# Patient Record
Sex: FEMALE | Race: ASIAN | Employment: PART TIME | URBAN - METROPOLITAN AREA
[De-identification: names, ages, dates, MRNs, and addresses within clinical notes are randomized per-mention and may not be internally consistent; named-entity substitution may affect disease eponyms.]

---

## 2017-02-13 ENCOUNTER — PATIENT MESSAGE (OUTPATIENT)
Dept: FAMILY MEDICINE CLINIC | Facility: CLINIC | Age: 42
End: 2017-02-13

## 2017-02-13 RX ORDER — INSULIN LISPRO 100 [IU]/ML
INJECTION, SUSPENSION SUBCUTANEOUS
Qty: 15 ML | Refills: 3 | Status: CANCELLED | OUTPATIENT
Start: 2017-02-13

## 2017-02-13 NOTE — TELEPHONE ENCOUNTER
From: Lashawn Solares  To: Debbie Guadarrama MD  Sent: 2/13/2017 10:32 AM CST  Subject: Medication Renewal Request    Original authorizing provider: MD Chery Jackson would like a refill of the following medications:  NOVOFINE 32G X 6 MM Does not

## 2017-02-14 NOTE — TELEPHONE ENCOUNTER
From: Narda Babinski  To: Leila Mayorga MD  Sent: 2/13/2017 10:19 AM CST  Subject: Other    Pls update my medical record. I had my Papsmear with Dr Patrick Doing on December 2016 and received my Influenza vaccine at work on October 2016.

## 2017-02-15 ENCOUNTER — PATIENT MESSAGE (OUTPATIENT)
Dept: FAMILY MEDICINE CLINIC | Facility: CLINIC | Age: 42
End: 2017-02-15

## 2017-02-15 RX ORDER — INSULIN LISPRO 100 [IU]/ML
INJECTION, SUSPENSION SUBCUTANEOUS
Qty: 18 PEN | Refills: 1 | Status: SHIPPED | OUTPATIENT
Start: 2017-02-15 | End: 2017-02-15

## 2017-02-15 RX ORDER — INSULIN LISPRO 100 [IU]/ML
INJECTION, SUSPENSION SUBCUTANEOUS
Qty: 18 PEN | Refills: 1 | Status: SHIPPED | OUTPATIENT
Start: 2017-02-15 | End: 2017-08-25

## 2017-02-15 NOTE — TELEPHONE ENCOUNTER
From: Caro Solares  To: Moose Rodgers MD  Sent: 2/15/2017 8:58 AM CST  Subject: Prescription Question    I sent a request to renew my prescriptions a few days ago but have not gotten them yet.  I still have refills but requested for 90 day supply prescript

## 2017-02-15 NOTE — TELEPHONE ENCOUNTER
Type ii or unspecified type diabetes mellitus without mention of complication, not stated as uncontrolled (hcc)  Doing well on Invokana, metformin and humalog  Will check labs  Follow-up in 1 year      Requesting Invokona, Metformin, Humalog, Novofin

## 2017-05-22 ENCOUNTER — TELEPHONE (OUTPATIENT)
Dept: FAMILY MEDICINE CLINIC | Facility: CLINIC | Age: 42
End: 2017-05-22

## 2017-05-22 DIAGNOSIS — E56.9 VITAMIN DEFICIENCY: ICD-10-CM

## 2017-05-22 DIAGNOSIS — Z00.00 ROUTINE GENERAL MEDICAL EXAMINATION AT A HEALTH CARE FACILITY: ICD-10-CM

## 2017-05-22 DIAGNOSIS — E11.9 TYPE 2 DIABETES MELLITUS WITHOUT COMPLICATION, UNSPECIFIED LONG TERM INSULIN USE STATUS: ICD-10-CM

## 2017-05-22 DIAGNOSIS — R80.9 MICROALBUMINURIA: ICD-10-CM

## 2017-05-22 DIAGNOSIS — D64.9 ANEMIA, UNSPECIFIED TYPE: Primary | ICD-10-CM

## 2017-05-22 NOTE — TELEPHONE ENCOUNTER
Lab orders pended, any other labs you would like for pt to have drawn?       Future Appointments  Date Time Provider Nazario Viki   6/2/2017 9:00 AM Alejandro Patel MD [facility] [address] [facility] [address]

## 2017-05-26 ENCOUNTER — LAB ENCOUNTER (OUTPATIENT)
Dept: LAB | Age: 42
End: 2017-05-26
Attending: INTERNAL MEDICINE
Payer: COMMERCIAL

## 2017-05-26 DIAGNOSIS — E11.9 TYPE 2 DIABETES MELLITUS WITHOUT COMPLICATION, UNSPECIFIED LONG TERM INSULIN USE STATUS: ICD-10-CM

## 2017-05-26 DIAGNOSIS — R80.9 MICROALBUMINURIA: ICD-10-CM

## 2017-05-26 DIAGNOSIS — Z00.00 ROUTINE GENERAL MEDICAL EXAMINATION AT A HEALTH CARE FACILITY: ICD-10-CM

## 2017-05-26 DIAGNOSIS — E56.9 VITAMIN DEFICIENCY: ICD-10-CM

## 2017-05-26 DIAGNOSIS — D64.9 ANEMIA, UNSPECIFIED TYPE: ICD-10-CM

## 2017-05-26 PROCEDURE — 82607 VITAMIN B-12: CPT | Performed by: INTERNAL MEDICINE

## 2017-05-26 PROCEDURE — 82728 ASSAY OF FERRITIN: CPT | Performed by: INTERNAL MEDICINE

## 2017-05-26 PROCEDURE — 82570 ASSAY OF URINE CREATININE: CPT | Performed by: INTERNAL MEDICINE

## 2017-05-26 PROCEDURE — 83036 HEMOGLOBIN GLYCOSYLATED A1C: CPT | Performed by: INTERNAL MEDICINE

## 2017-05-26 PROCEDURE — 83540 ASSAY OF IRON: CPT | Performed by: INTERNAL MEDICINE

## 2017-05-26 PROCEDURE — 82043 UR ALBUMIN QUANTITATIVE: CPT | Performed by: INTERNAL MEDICINE

## 2017-05-26 PROCEDURE — 82746 ASSAY OF FOLIC ACID SERUM: CPT | Performed by: INTERNAL MEDICINE

## 2017-05-26 PROCEDURE — 36415 COLL VENOUS BLD VENIPUNCTURE: CPT | Performed by: INTERNAL MEDICINE

## 2017-05-26 PROCEDURE — 83550 IRON BINDING TEST: CPT | Performed by: INTERNAL MEDICINE

## 2017-05-26 PROCEDURE — 80050 GENERAL HEALTH PANEL: CPT | Performed by: INTERNAL MEDICINE

## 2017-05-26 PROCEDURE — 80061 LIPID PANEL: CPT | Performed by: INTERNAL MEDICINE

## 2017-06-20 ENCOUNTER — OFFICE VISIT (OUTPATIENT)
Dept: FAMILY MEDICINE CLINIC | Facility: CLINIC | Age: 42
End: 2017-06-20

## 2017-06-20 VITALS
HEART RATE: 70 BPM | HEIGHT: 63 IN | SYSTOLIC BLOOD PRESSURE: 110 MMHG | BODY MASS INDEX: 24.8 KG/M2 | WEIGHT: 140 LBS | TEMPERATURE: 98 F | DIASTOLIC BLOOD PRESSURE: 70 MMHG | RESPIRATION RATE: 16 BRPM

## 2017-06-20 DIAGNOSIS — E11.9 CONTROLLED TYPE 2 DIABETES MELLITUS WITH INSULIN THERAPY (HCC): ICD-10-CM

## 2017-06-20 DIAGNOSIS — D50.8 IRON DEFICIENCY ANEMIA SECONDARY TO INADEQUATE DIETARY IRON INTAKE: ICD-10-CM

## 2017-06-20 DIAGNOSIS — Z00.01 ENCOUNTER FOR GENERAL ADULT MEDICAL EXAMINATION WITH ABNORMAL FINDINGS: Primary | ICD-10-CM

## 2017-06-20 DIAGNOSIS — Z79.4 CONTROLLED TYPE 2 DIABETES MELLITUS WITH INSULIN THERAPY (HCC): ICD-10-CM

## 2017-06-20 DIAGNOSIS — Z12.31 ENCOUNTER FOR SCREENING MAMMOGRAM FOR BREAST CANCER: ICD-10-CM

## 2017-06-20 DIAGNOSIS — R80.9 MICROALBUMINURIA: ICD-10-CM

## 2017-06-20 DIAGNOSIS — Z12.11 COLON CANCER SCREENING: ICD-10-CM

## 2017-06-20 PROBLEM — D50.9 IRON DEFICIENCY ANEMIA: Status: ACTIVE | Noted: 2017-06-20

## 2017-06-20 PROCEDURE — 99396 PREV VISIT EST AGE 40-64: CPT | Performed by: INTERNAL MEDICINE

## 2017-06-20 RX ORDER — CHLORAL HYDRATE 500 MG
1000 CAPSULE ORAL DAILY
COMMUNITY
End: 2018-02-07

## 2017-06-20 NOTE — PROGRESS NOTES
Wellness Exam    REASON FOR VISIT:    Norma Ang is a 38year old female who presents for an [address].     Current Complaints: dm, anemia  Flu shot: done   Health Maintenance Topics with due status: Due Soon       Topic Date Due    Annual sex active/on OCPs; >24 high risk No results found for: CHLAMYDIA    Colonoscopy Screen Every 10 years There are no preventive care reminders to display for this patient.     Flex Sigmoidoscopy Screen  Every 5 years No results found for this or any previous 18 pen Rfl: 1   Insulin Pen Needle (NOVOFINE) 32G X 6 MM Does not apply Misc USE DAILY AS DIRECTED WITH HUMALOG Disp: 200 each Rfl: 1   MetFORMIN HCl 1000 MG Oral Tab Take 1 tablet (1,000 mg total) by mouth 2 (two) times daily with meals.  Disp: 180 tablet 05/21/2017   Patient's last menstrual period was 05/21/2017. Constitutional: She appears her stated age, nourished, and pleasant. Vital signs reviewed as noted  Head: Normocephalic and atraumatic.    Nose: Nose normal.   Eyes: EOM are normal. Pupils are e visit:    Iron deficiency anemia secondary to inadequate dietary iron intake newly dx start pelvic us  Check stool test to see causes of anemia  And start iron replacement  -     US PELVIS (TRANSABDOMINAL PELVIS)  (DJD=45465);  Future  -     edward stool fe 11/02/2010, 10/13/2011, 11/19/2012, 10/17/2013, 10/03/2016       Influenza Annually   Pneumococcal if high risk   Td/Tdap once then every 10 years   HPV Females 11-26: 3 doses   Zoster (Shingles) 60 and older: one dose   Varicella 2 doses if not immune   M

## 2017-06-20 NOTE — PATIENT INSTRUCTIONS
Thank you for choosing Nithya Boyd MD at [place] [address]  To Do: Bailee Solares  1.  Ultrasound Call central scheduling [phone] to schedule your test.  Most tests are done in Building A inside ER building next door, or in [facility], but other opt Physical Therapy call [phone] usually in [facility] in Clinic in [place] at [facility].  [address] Mon-Fri at 8am-7:30pm, and Sat/Sun 9am-430pm  Also at [address]  Call [phone] for info    • Please call our office about any ques

## 2017-07-18 ENCOUNTER — HOSPITAL ENCOUNTER (OUTPATIENT)
Dept: MAMMOGRAPHY | Age: 42
Discharge: HOME | End: 2017-07-18
Attending: INTERNAL MEDICINE
Payer: COMMERCIAL

## 2017-07-18 ENCOUNTER — HOSPITAL ENCOUNTER (OUTPATIENT)
Dept: ULTRASOUND IMAGING | Age: 42
Discharge: HOME | End: 2017-07-18
Attending: INTERNAL MEDICINE
Payer: COMMERCIAL

## 2017-07-18 DIAGNOSIS — D50.8 IRON DEFICIENCY ANEMIA SECONDARY TO INADEQUATE DIETARY IRON INTAKE: ICD-10-CM

## 2017-07-18 DIAGNOSIS — Z12.31 ENCOUNTER FOR SCREENING MAMMOGRAM FOR BREAST CANCER: ICD-10-CM

## 2017-07-18 PROBLEM — D25.9 FIBROID UTERUS: Status: ACTIVE | Noted: 2017-07-18

## 2017-07-18 PROCEDURE — 76830 TRANSVAGINAL US NON-OB: CPT | Performed by: INTERNAL MEDICINE

## 2017-07-18 PROCEDURE — 76856 US EXAM PELVIC COMPLETE: CPT | Performed by: INTERNAL MEDICINE

## 2017-07-18 PROCEDURE — 77063 BREAST TOMOSYNTHESIS BI: CPT | Performed by: INTERNAL MEDICINE

## 2017-07-18 PROCEDURE — 77067 SCR MAMMO BI INCL CAD: CPT | Performed by: INTERNAL MEDICINE

## 2017-07-27 ENCOUNTER — HOSPITAL ENCOUNTER (OUTPATIENT)
Dept: MAMMOGRAPHY | Facility: HOSPITAL | Age: 42
Discharge: HOME | End: 2017-07-27
Attending: INTERNAL MEDICINE
Payer: COMMERCIAL

## 2017-07-27 DIAGNOSIS — R92.8 ABNORMAL MAMMOGRAM OF BOTH BREASTS: ICD-10-CM

## 2017-07-27 PROCEDURE — 76642 ULTRASOUND BREAST LIMITED: CPT | Performed by: INTERNAL MEDICINE

## 2017-07-27 PROCEDURE — 77065 DX MAMMO INCL CAD UNI: CPT | Performed by: INTERNAL MEDICINE

## 2017-07-27 PROCEDURE — 77061 BREAST TOMOSYNTHESIS UNI: CPT | Performed by: INTERNAL MEDICINE

## 2017-08-28 RX ORDER — CANAGLIFLOZIN 300 MG/1
1 TABLET, FILM COATED ORAL DAILY
Qty: 90 TABLET | Refills: 1 | Status: SHIPPED | OUTPATIENT
Start: 2017-08-28 | End: 2018-02-07

## 2017-08-28 NOTE — TELEPHONE ENCOUNTER
Requesting Inovokona and Metformin  LOV: 6/20/17  RTC: not noted  Last Labs: 5/26/17  Filled: 2/15/17 #180 with 1 refills metformin  Filled: 2/15/17 #90 with 1 refill  Inovkona  Filled: 2/15/17 #18 pens with 1 refill  Humalog    No future appointments.

## 2017-08-29 RX ORDER — INSULIN LISPRO 100 [IU]/ML
INJECTION, SUSPENSION SUBCUTANEOUS
Qty: 18 PEN | Refills: 3 | Status: SHIPPED | OUTPATIENT
Start: 2017-08-29 | End: 2017-11-16

## 2017-10-18 PROCEDURE — 82272 OCCULT BLD FECES 1-3 TESTS: CPT

## 2017-10-19 NOTE — PROGRESS NOTES
Received via fax 09/28/17 diabetic eye exam report from [facility], OD  No diabetic retinopathy. RTC 09/28/2018. DM flow sheet updated. Report placed in Dr. Gareth Loera in box for review prior to being sent to scanning.

## 2017-10-21 ENCOUNTER — LAB ENCOUNTER (OUTPATIENT)
Dept: LAB | Facility: HOSPITAL | Age: 42
End: 2017-10-21
Attending: INTERNAL MEDICINE
Payer: COMMERCIAL

## 2017-10-21 DIAGNOSIS — D50.8 IRON DEFICIENCY ANEMIA SECONDARY TO INADEQUATE DIETARY IRON INTAKE: ICD-10-CM

## 2017-10-21 DIAGNOSIS — Z00.00 ROUTINE GENERAL MEDICAL EXAMINATION AT A HEALTH CARE FACILITY: Primary | ICD-10-CM

## 2017-11-16 ENCOUNTER — TELEPHONE (OUTPATIENT)
Dept: FAMILY MEDICINE CLINIC | Facility: CLINIC | Age: 42
End: 2017-11-16

## 2017-11-16 RX ORDER — INSULIN LISPRO 100 [IU]/ML
INJECTION, SUSPENSION SUBCUTANEOUS
Qty: 18 PEN | Refills: 0 | Status: SHIPPED
Start: 2017-11-16 | End: 2018-02-07

## 2017-11-16 NOTE — TELEPHONE ENCOUNTER
Diabetic on insulin   Last a1c: 5/26/17  Should come in to establish OV with someone   3 mo script given

## 2017-11-16 NOTE — TELEPHONE ENCOUNTER
1st outreach to schedule ov to establish care with provider. Effective December 21, Dr. Vilma Keating will no longer be affiliated with [facility]. We want to work with you to transition to another provider with our department. Pt will schedule an appt after looking at [facility] online.

## 2017-11-29 RX ORDER — INSULIN LISPRO 100 [IU]/ML
INJECTION, SUSPENSION SUBCUTANEOUS
Refills: 3 | OUTPATIENT
Start: 2017-11-29

## 2018-03-15 ENCOUNTER — HOSPITAL ENCOUNTER (OUTPATIENT)
Dept: ULTRASOUND IMAGING | Age: 43
Discharge: HOME | End: 2018-03-15
Attending: INTERNAL MEDICINE
Payer: COMMERCIAL

## 2018-03-15 ENCOUNTER — HOSPITAL ENCOUNTER (OUTPATIENT)
Dept: MAMMOGRAPHY | Age: 43
Discharge: HOME | End: 2018-03-15
Attending: INTERNAL MEDICINE
Payer: COMMERCIAL

## 2018-03-15 DIAGNOSIS — N63.0 BREAST NODULE: ICD-10-CM

## 2018-03-15 PROCEDURE — 77066 DX MAMMO INCL CAD BI: CPT | Performed by: INTERNAL MEDICINE

## 2018-03-15 PROCEDURE — 77062 BREAST TOMOSYNTHESIS BI: CPT | Performed by: INTERNAL MEDICINE

## 2018-03-15 PROCEDURE — 76642 ULTRASOUND BREAST LIMITED: CPT | Performed by: INTERNAL MEDICINE

## 2019-01-08 ENCOUNTER — LAB ENCOUNTER (OUTPATIENT)
Dept: LAB | Age: 44
End: 2019-01-08
Attending: INTERNAL MEDICINE
Payer: COMMERCIAL

## 2019-01-08 DIAGNOSIS — E11.9 CONTROLLED TYPE 2 DIABETES MELLITUS WITH INSULIN THERAPY (HCC): ICD-10-CM

## 2019-01-08 DIAGNOSIS — R80.9 MICROALBUMINURIA: ICD-10-CM

## 2019-01-08 DIAGNOSIS — Z79.4 CONTROLLED TYPE 2 DIABETES MELLITUS WITH INSULIN THERAPY (HCC): ICD-10-CM

## 2019-01-08 PROBLEM — E78.5 DYSLIPIDEMIA: Status: ACTIVE | Noted: 2019-01-08

## 2019-01-08 LAB
ALBUMIN SERPL-MCNC: 3.9 G/DL (ref 3.1–4.5)
ALBUMIN/GLOB SERPL: 0.9 {RATIO} (ref 1–2)
ALP LIVER SERPL-CCNC: 60 U/L (ref 37–98)
ALT SERPL-CCNC: 25 U/L (ref 14–54)
ANION GAP SERPL CALC-SCNC: 7 MMOL/L (ref 0–18)
AST SERPL-CCNC: 17 U/L (ref 15–41)
BASOPHILS # BLD AUTO: 0.05 X10(3) UL (ref 0–0.1)
BASOPHILS NFR BLD AUTO: 0.6 %
BILIRUB SERPL-MCNC: 0.7 MG/DL (ref 0.1–2)
BUN BLD-MCNC: 18 MG/DL (ref 8–20)
BUN/CREAT SERPL: 29 (ref 10–20)
CALCIUM BLD-MCNC: 8.4 MG/DL (ref 8.3–10.3)
CHLORIDE SERPL-SCNC: 105 MMOL/L (ref 101–111)
CHOLEST SMN-MCNC: 163 MG/DL (ref ?–200)
CO2 SERPL-SCNC: 25 MMOL/L (ref 22–32)
CREAT BLD-MCNC: 0.62 MG/DL (ref 0.55–1.02)
CREAT UR-SCNC: 289 MG/DL
EOSINOPHIL # BLD AUTO: 0.31 X10(3) UL (ref 0–0.3)
EOSINOPHIL NFR BLD AUTO: 3.9 %
ERYTHROCYTE [DISTWIDTH] IN BLOOD BY AUTOMATED COUNT: 14.6 % (ref 11.5–16)
EST. AVERAGE GLUCOSE BLD GHB EST-MCNC: 151 MG/DL (ref 68–126)
GLOBULIN PLAS-MCNC: 4.2 G/DL (ref 2.8–4.4)
GLUCOSE BLD-MCNC: 117 MG/DL (ref 70–99)
HBA1C MFR BLD HPLC: 6.9 % (ref ?–5.7)
HCT VFR BLD AUTO: 39.7 % (ref 34–50)
HDLC SERPL-MCNC: 45 MG/DL (ref 40–59)
HGB BLD-MCNC: 11.9 G/DL (ref 12–16)
IMMATURE GRANULOCYTE COUNT: 0.02 X10(3) UL (ref 0–1)
IMMATURE GRANULOCYTE RATIO %: 0.2 %
LDLC SERPL CALC-MCNC: 101 MG/DL (ref ?–100)
LYMPHOCYTES # BLD AUTO: 2.07 X10(3) UL (ref 0.9–4)
LYMPHOCYTES NFR BLD AUTO: 25.8 %
M PROTEIN MFR SERPL ELPH: 8.1 G/DL (ref 6.4–8.2)
MCH RBC QN AUTO: 24.1 PG (ref 27–33.2)
MCHC RBC AUTO-ENTMCNC: 30 G/DL (ref 31–37)
MCV RBC AUTO: 80.5 FL (ref 81–100)
MICROALBUMIN UR-MCNC: 4.87 MG/DL
MICROALBUMIN/CREAT 24H UR-RTO: 16.9 UG/MG (ref ?–30)
MONOCYTES # BLD AUTO: 0.66 X10(3) UL (ref 0.1–1)
MONOCYTES NFR BLD AUTO: 8.2 %
NEUTROPHIL ABS PRELIM: 4.91 X10 (3) UL (ref 1.3–6.7)
NEUTROPHILS # BLD AUTO: 4.91 X10(3) UL (ref 1.3–6.7)
NEUTROPHILS NFR BLD AUTO: 61.3 %
NONHDLC SERPL-MCNC: 118 MG/DL (ref ?–130)
OSMOLALITY SERPL CALC.SUM OF ELEC: 287 MOSM/KG (ref 275–295)
PLATELET # BLD AUTO: 442 10(3)UL (ref 150–450)
POTASSIUM SERPL-SCNC: 3.8 MMOL/L (ref 3.6–5.1)
RBC # BLD AUTO: 4.93 X10(6)UL (ref 3.8–5.1)
RED CELL DISTRIBUTION WIDTH-SD: 42.4 FL (ref 35.1–46.3)
SODIUM SERPL-SCNC: 137 MMOL/L (ref 136–144)
TRIGL SERPL-MCNC: 84 MG/DL (ref 30–149)
VLDLC SERPL CALC-MCNC: 17 MG/DL (ref 0–30)
WBC # BLD AUTO: 8 X10(3) UL (ref 4–13)

## 2019-01-08 PROCEDURE — 80053 COMPREHEN METABOLIC PANEL: CPT

## 2019-01-08 PROCEDURE — 80061 LIPID PANEL: CPT

## 2019-01-08 PROCEDURE — 82570 ASSAY OF URINE CREATININE: CPT

## 2019-01-08 PROCEDURE — 82043 UR ALBUMIN QUANTITATIVE: CPT

## 2019-01-08 PROCEDURE — 36415 COLL VENOUS BLD VENIPUNCTURE: CPT

## 2019-01-08 PROCEDURE — 85025 COMPLETE CBC W/AUTO DIFF WBC: CPT

## 2019-01-08 PROCEDURE — 83036 HEMOGLOBIN GLYCOSYLATED A1C: CPT

## 2019-02-25 ENCOUNTER — OFFICE VISIT (OUTPATIENT)
Dept: OBGYN CLINIC | Facility: CLINIC | Age: 44
End: 2019-02-25
Payer: COMMERCIAL

## 2019-02-25 VITALS
HEART RATE: 84 BPM | HEIGHT: 62.75 IN | SYSTOLIC BLOOD PRESSURE: 112 MMHG | BODY MASS INDEX: 24.94 KG/M2 | DIASTOLIC BLOOD PRESSURE: 82 MMHG | WEIGHT: 139 LBS

## 2019-02-25 DIAGNOSIS — Z01.419 WELL FEMALE EXAM WITH ROUTINE GYNECOLOGICAL EXAM: Primary | ICD-10-CM

## 2019-02-25 DIAGNOSIS — Z12.39 BREAST CANCER SCREENING: ICD-10-CM

## 2019-02-25 DIAGNOSIS — D25.1 INTRAMURAL LEIOMYOMA OF UTERUS: ICD-10-CM

## 2019-02-25 DIAGNOSIS — Z12.4 CERVICAL CANCER SCREENING: ICD-10-CM

## 2019-02-25 PROCEDURE — 88175 CYTOPATH C/V AUTO FLUID REDO: CPT | Performed by: OBSTETRICS & GYNECOLOGY

## 2019-02-25 PROCEDURE — 87624 HPV HI-RISK TYP POOLED RSLT: CPT | Performed by: OBSTETRICS & GYNECOLOGY

## 2019-02-25 PROCEDURE — 99396 PREV VISIT EST AGE 40-64: CPT | Performed by: OBSTETRICS & GYNECOLOGY

## 2019-02-25 RX ORDER — CHLORAL HYDRATE 500 MG
CAPSULE ORAL
COMMUNITY

## 2019-02-25 NOTE — PROGRESS NOTES
GYN H&P     2019  11:33 AM    CC: Patient is here for annual    HPI: patient is a 41year old  here for her annual gyn exam.   She has no complaints. Menses are regular.  \"heavy\" but no change, last hgb per pt 11+ Denies any pelvic pain or irr Hypertension Father    • Other (CAD) Father    • Diabetes Mother         DM   • Hypertension Mother      Social History    Socioeconomic History      Marital status:       Spouse name: Not on file      Number of children: Not on file      Years of e Asked        Seat Belt: Yes        Self-Exams: Not Asked    Social History Narrative      Not on file      ROS:     Review of Systems:  General: denies fevers, chills, fatigue and malaise.    Eyes: no visual changes, denies headaches  ENT: no complaints, de lesions, normal clear discharge.                       Uterus: enlarged, irreg, 8 wk, mobile, non tender,                      Cervix: parous, no lesions                     Adnexa: non tender, no masses, normal size          Rectal: deferred  EXTREMITIES:

## 2019-02-26 LAB — HPV I/H RISK 1 DNA SPEC QL NAA+PROBE: NEGATIVE

## 2019-04-11 ENCOUNTER — HOSPITAL ENCOUNTER (OUTPATIENT)
Dept: MAMMOGRAPHY | Age: 44
Discharge: HOME | End: 2019-04-11
Attending: OBSTETRICS & GYNECOLOGY
Payer: COMMERCIAL

## 2019-04-11 ENCOUNTER — HOSPITAL ENCOUNTER (OUTPATIENT)
Dept: ULTRASOUND IMAGING | Age: 44
Discharge: HOME | End: 2019-04-11
Attending: OBSTETRICS & GYNECOLOGY
Payer: COMMERCIAL

## 2019-04-11 DIAGNOSIS — D25.1 INTRAMURAL LEIOMYOMA OF UTERUS: ICD-10-CM

## 2019-04-11 DIAGNOSIS — Z01.419 WELL FEMALE EXAM WITH ROUTINE GYNECOLOGICAL EXAM: ICD-10-CM

## 2019-04-11 DIAGNOSIS — Z12.39 BREAST CANCER SCREENING: ICD-10-CM

## 2019-04-11 PROCEDURE — 77067 SCR MAMMO BI INCL CAD: CPT | Performed by: OBSTETRICS & GYNECOLOGY

## 2019-04-11 PROCEDURE — 76830 TRANSVAGINAL US NON-OB: CPT | Performed by: OBSTETRICS & GYNECOLOGY

## 2019-04-11 PROCEDURE — 77063 BREAST TOMOSYNTHESIS BI: CPT | Performed by: OBSTETRICS & GYNECOLOGY

## 2019-04-11 PROCEDURE — 76856 US EXAM PELVIC COMPLETE: CPT | Performed by: OBSTETRICS & GYNECOLOGY

## 2019-04-22 ENCOUNTER — APPOINTMENT (OUTPATIENT)
Dept: CT IMAGING | Age: 44
End: 2019-04-22
Attending: EMERGENCY MEDICINE
Payer: COMMERCIAL

## 2019-04-22 ENCOUNTER — HOSPITAL ENCOUNTER (EMERGENCY)
Age: 44
Discharge: HOME | End: 2019-04-22
Attending: EMERGENCY MEDICINE
Payer: COMMERCIAL

## 2019-04-22 VITALS
WEIGHT: 140 LBS | RESPIRATION RATE: 16 BRPM | OXYGEN SATURATION: 99 % | DIASTOLIC BLOOD PRESSURE: 65 MMHG | HEIGHT: 63 IN | BODY MASS INDEX: 24.8 KG/M2 | HEART RATE: 84 BPM | SYSTOLIC BLOOD PRESSURE: 129 MMHG | TEMPERATURE: 99 F

## 2019-04-22 DIAGNOSIS — R07.89 CHEST PAIN, ATYPICAL: Primary | ICD-10-CM

## 2019-04-22 PROCEDURE — 87086 URINE CULTURE/COLONY COUNT: CPT | Performed by: EMERGENCY MEDICINE

## 2019-04-22 PROCEDURE — 81025 URINE PREGNANCY TEST: CPT

## 2019-04-22 PROCEDURE — 81001 URINALYSIS AUTO W/SCOPE: CPT | Performed by: EMERGENCY MEDICINE

## 2019-04-22 PROCEDURE — 71275 CT ANGIOGRAPHY CHEST: CPT | Performed by: EMERGENCY MEDICINE

## 2019-04-22 PROCEDURE — 87147 CULTURE TYPE IMMUNOLOGIC: CPT | Performed by: EMERGENCY MEDICINE

## 2019-04-22 PROCEDURE — 85025 COMPLETE CBC W/AUTO DIFF WBC: CPT | Performed by: EMERGENCY MEDICINE

## 2019-04-22 PROCEDURE — 36415 COLL VENOUS BLD VENIPUNCTURE: CPT

## 2019-04-22 PROCEDURE — 99285 EMERGENCY DEPT VISIT HI MDM: CPT

## 2019-04-22 PROCEDURE — 93005 ELECTROCARDIOGRAM TRACING: CPT

## 2019-04-22 PROCEDURE — 83690 ASSAY OF LIPASE: CPT | Performed by: EMERGENCY MEDICINE

## 2019-04-22 PROCEDURE — 93010 ELECTROCARDIOGRAM REPORT: CPT

## 2019-04-22 PROCEDURE — 80053 COMPREHEN METABOLIC PANEL: CPT | Performed by: EMERGENCY MEDICINE

## 2019-04-22 PROCEDURE — 84484 ASSAY OF TROPONIN QUANT: CPT | Performed by: EMERGENCY MEDICINE

## 2019-04-22 NOTE — ED INITIAL ASSESSMENT (HPI)
Sudden onset of pinpoint left sided chest pain, sharp, non radiating while driving home from work . She is RN at [employer].  Denies N/V, SOB, diaphoresis or other c/o

## 2019-04-22 NOTE — ED NOTES
Returned to ED, placed back on cardiac monitor. States pain is not as sharp now, describes it as vague. Denies need for pain medication.  Updated on plan of care

## 2019-04-22 NOTE — ED PROVIDER NOTES
Patient Seen in: [facility] [place]    History   Patient presents with:  Chest Pain Angina (cardiovascular)    Stated Complaint:     HPI    Tonight the patient was driving home from work and developed sudden sharp left-sided chest moist.  Neck: Supple without adenopathy  Lungs: Clear  Chest: Nontender  Heart: Apical pulse 80 and regular without murmur rub  Abdomen: Soft nontender without mass or HSM. No hernia. No CVA tenderness.   Extremities: No peripheral edema or evidence of DV compared to January 2012. CTA chest: No evidence of pulmonary embolism, thoracic artery disease, pulmonary parenchymal disease. Nodularity within breast.  Patient is aware. Calcified nodule right thyroid gland.          Patient informed of CT findings

## 2019-04-25 ENCOUNTER — HOSPITAL ENCOUNTER (OUTPATIENT)
Dept: MAMMOGRAPHY | Age: 44
Discharge: HOME | End: 2019-04-25
Attending: OBSTETRICS & GYNECOLOGY
Payer: COMMERCIAL

## 2019-04-25 ENCOUNTER — TELEPHONE (OUTPATIENT)
Dept: OBGYN CLINIC | Facility: CLINIC | Age: 44
End: 2019-04-25

## 2019-04-25 DIAGNOSIS — R92.2 INCONCLUSIVE MAMMOGRAM: ICD-10-CM

## 2019-04-25 PROCEDURE — 77061 BREAST TOMOSYNTHESIS UNI: CPT | Performed by: OBSTETRICS & GYNECOLOGY

## 2019-04-25 PROCEDURE — 77065 DX MAMMO INCL CAD UNI: CPT | Performed by: OBSTETRICS & GYNECOLOGY

## 2019-04-25 NOTE — IMAGING NOTE
This Breast Care RN assisted Dr. Trixie Dawson with recommendation for a Right breast stereotactic biopsy for calcifications. Procedure reviewed and all questions answered. Emotional and educational support given.    On the day of the biopsy, pt instructed to jennifer

## 2019-04-25 NOTE — TELEPHONE ENCOUNTER
Dr. Bela Sauer is recommending a right stereotactic breast biopsy for calcifications seen on additional mammogram views today. Routed to Dr. Kartik Tellez for review and order.

## 2019-04-26 RX ORDER — CEPHALEXIN 500 MG/1
500 CAPSULE ORAL 2 TIMES DAILY
Qty: 14 CAPSULE | Refills: 0 | Status: SHIPPED | OUTPATIENT
Start: 2019-04-26 | End: 2019-05-03

## 2019-05-16 ENCOUNTER — HOSPITAL ENCOUNTER (OUTPATIENT)
Dept: MAMMOGRAPHY | Facility: HOSPITAL | Age: 44
Discharge: HOME | End: 2019-05-16
Attending: OBSTETRICS & GYNECOLOGY
Payer: COMMERCIAL

## 2019-05-16 DIAGNOSIS — R92.1 BREAST CALCIFICATIONS: ICD-10-CM

## 2019-05-16 PROCEDURE — 19081 BX BREAST 1ST LESION STRTCTC: CPT | Performed by: OBSTETRICS & GYNECOLOGY

## 2019-05-16 PROCEDURE — 88305 TISSUE EXAM BY PATHOLOGIST: CPT | Performed by: OBSTETRICS & GYNECOLOGY

## 2019-05-17 PROBLEM — N60.19 FIBROCYSTIC BREAST CHANGES: Status: ACTIVE | Noted: 2019-05-17

## 2019-05-20 ENCOUNTER — TELEPHONE (OUTPATIENT)
Dept: MAMMOGRAPHY | Facility: HOSPITAL | Age: 44
End: 2019-05-20

## 2019-05-20 NOTE — TELEPHONE ENCOUNTER
Telephoned Bogdan Solares and name,  verified with pt. Notified Bogdan Solares of negative biopsy result. Concordance verified by radiologist, Dr. Kiko Shannon. Sharyle Rising reports biopsy site is healing well. Hematoma management discussed.  Radiologist velia

## 2019-11-14 ENCOUNTER — HOSPITAL ENCOUNTER (OUTPATIENT)
Dept: MAMMOGRAPHY | Age: 44
Discharge: HOME | End: 2019-11-14
Attending: OBSTETRICS & GYNECOLOGY
Payer: COMMERCIAL

## 2019-11-14 DIAGNOSIS — R92.8 ABNORMAL MAMMOGRAM: ICD-10-CM

## 2019-11-14 PROCEDURE — 77061 BREAST TOMOSYNTHESIS UNI: CPT | Performed by: OBSTETRICS & GYNECOLOGY

## 2019-11-14 PROCEDURE — 77065 DX MAMMO INCL CAD UNI: CPT | Performed by: OBSTETRICS & GYNECOLOGY

## 2019-11-19 ENCOUNTER — HOSPITAL ENCOUNTER (OUTPATIENT)
Dept: CT IMAGING | Age: 44
Discharge: HOME | End: 2019-11-19
Attending: INTERNAL MEDICINE

## 2019-11-19 DIAGNOSIS — E11.9 CONTROLLED TYPE 2 DIABETES MELLITUS WITH INSULIN THERAPY (HCC): ICD-10-CM

## 2019-11-19 DIAGNOSIS — R07.9 CHEST PAIN, UNSPECIFIED TYPE: ICD-10-CM

## 2019-11-19 DIAGNOSIS — Z79.4 CONTROLLED TYPE 2 DIABETES MELLITUS WITH INSULIN THERAPY (HCC): ICD-10-CM

## 2019-12-06 ENCOUNTER — LAB ENCOUNTER (OUTPATIENT)
Dept: LAB | Age: 44
End: 2019-12-06
Attending: INTERNAL MEDICINE
Payer: COMMERCIAL

## 2019-12-06 DIAGNOSIS — E11.9 CONTROLLED TYPE 2 DIABETES MELLITUS WITH INSULIN THERAPY (HCC): ICD-10-CM

## 2019-12-06 DIAGNOSIS — Z79.4 CONTROLLED TYPE 2 DIABETES MELLITUS WITH INSULIN THERAPY (HCC): ICD-10-CM

## 2019-12-06 PROCEDURE — 80061 LIPID PANEL: CPT

## 2019-12-06 PROCEDURE — 83036 HEMOGLOBIN GLYCOSYLATED A1C: CPT

## 2019-12-06 PROCEDURE — 83525 ASSAY OF INSULIN: CPT

## 2019-12-06 PROCEDURE — 80048 BASIC METABOLIC PNL TOTAL CA: CPT

## 2019-12-06 PROCEDURE — 36415 COLL VENOUS BLD VENIPUNCTURE: CPT

## 2019-12-06 NOTE — PROGRESS NOTES
Lab shows your insulin is too high. The best advice is to bring down your carbohydrates further, because you have insulin resistance. Insulin Resistance  A normal fasting insulin should be around 5 or less.   The higher your insulin, the slower your met insulin can also make you feel hungrier and signal the body to store more calories as fat.     The highly processed nature of our daily carbohydrate food choices, including breads, crackers, chips, rice, noodles, potatoes, grains and oats, breakfast cereals on the internet  Watch on [employer] Prof. Marylin Magaña - 'Medical aspects of the low carbohydrate lifestyle'  How to Reverse Insulin Resistance by Meli  How Long Does it Take To Reverse Insulin Resistance?   Dr Scott Dubon

## 2019-12-10 PROBLEM — R91.1 LUNG NODULE: Status: ACTIVE | Noted: 2019-12-10

## 2019-12-10 PROBLEM — E04.1 THYROID NODULE: Status: ACTIVE | Noted: 2019-12-10

## 2020-08-12 ENCOUNTER — LAB ENCOUNTER (OUTPATIENT)
Dept: LAB | Facility: HOSPITAL | Age: 45
End: 2020-08-12
Attending: PREVENTIVE MEDICINE
Payer: COMMERCIAL

## 2020-08-12 ENCOUNTER — TELEPHONE (OUTPATIENT)
Dept: LAB | Facility: HOSPITAL | Age: 45
End: 2020-08-12

## 2020-08-12 DIAGNOSIS — Z20.822 CLOSE EXPOSURE TO COVID-19 VIRUS: Primary | ICD-10-CM

## 2020-08-12 DIAGNOSIS — Z20.822 CLOSE EXPOSURE TO COVID-19 VIRUS: ICD-10-CM

## 2020-08-12 LAB — SARS-COV-2 RNA RESP QL NAA+PROBE: NOT DETECTED

## 2020-08-19 ENCOUNTER — LAB ENCOUNTER (OUTPATIENT)
Dept: LAB | Facility: HOSPITAL | Age: 45
End: 2020-08-19
Attending: PREVENTIVE MEDICINE
Payer: COMMERCIAL

## 2020-08-19 DIAGNOSIS — Z20.822 CLOSE EXPOSURE TO COVID-19 VIRUS: ICD-10-CM

## 2020-08-19 LAB — SARS-COV-2 RNA RESP QL NAA+PROBE: NOT DETECTED

## 2020-08-24 ENCOUNTER — EMPLOYEE HEALTH (OUTPATIENT)
Dept: INTERNAL MEDICINE CLINIC | Facility: HOSPITAL | Age: 45
End: 2020-08-24
Attending: PREVENTIVE MEDICINE
Payer: COMMERCIAL

## 2020-08-24 DIAGNOSIS — Z20.822 EXPOSURE TO COVID-19 VIRUS: Primary | ICD-10-CM

## 2020-08-26 LAB — SARS-COV-2 RNA RESP QL NAA+PROBE: NOT DETECTED

## 2020-08-27 ENCOUNTER — EMPLOYEE HEALTH (OUTPATIENT)
Dept: INTERNAL MEDICINE CLINIC | Facility: HOSPITAL | Age: 45
End: 2020-08-27
Attending: PREVENTIVE MEDICINE
Payer: COMMERCIAL

## 2020-08-27 DIAGNOSIS — Z00.00 WELLNESS EXAMINATION: Primary | ICD-10-CM

## 2020-08-28 LAB — SARS-COV-2 RNA RESP QL NAA+PROBE: NOT DETECTED

## 2020-12-03 ENCOUNTER — HOSPITAL ENCOUNTER (OUTPATIENT)
Dept: MAMMOGRAPHY | Age: 45
Discharge: HOME | End: 2020-12-03
Attending: NURSE PRACTITIONER
Payer: COMMERCIAL

## 2020-12-03 DIAGNOSIS — Z12.31 BREAST CANCER SCREENING BY MAMMOGRAM: ICD-10-CM

## 2020-12-03 PROCEDURE — 77067 SCR MAMMO BI INCL CAD: CPT | Performed by: NURSE PRACTITIONER

## 2020-12-03 PROCEDURE — 77063 BREAST TOMOSYNTHESIS BI: CPT | Performed by: NURSE PRACTITIONER

## 2021-01-05 ENCOUNTER — IMMUNIZATION (OUTPATIENT)
Dept: LAB | Facility: HOSPITAL | Age: 46
End: 2021-01-05
Attending: PREVENTIVE MEDICINE
Payer: COMMERCIAL

## 2021-01-05 DIAGNOSIS — Z23 NEED FOR VACCINATION: ICD-10-CM

## 2021-01-05 PROCEDURE — 0011A SARSCOV2 VAC 100MCG/0.5ML IM: CPT

## 2021-02-02 ENCOUNTER — IMMUNIZATION (OUTPATIENT)
Dept: LAB | Facility: HOSPITAL | Age: 46
End: 2021-02-02
Attending: PREVENTIVE MEDICINE
Payer: COMMERCIAL

## 2021-02-02 DIAGNOSIS — Z23 NEED FOR VACCINATION: Primary | ICD-10-CM

## 2021-02-02 PROCEDURE — 0012A SARSCOV2 VAC 100MCG/0.5ML IM: CPT

## 2021-04-29 ENCOUNTER — OFFICE VISIT (OUTPATIENT)
Dept: INTERNAL MEDICINE CLINIC | Facility: CLINIC | Age: 46
End: 2021-04-29
Payer: COMMERCIAL

## 2021-04-29 ENCOUNTER — TELEPHONE (OUTPATIENT)
Dept: INTERNAL MEDICINE CLINIC | Facility: CLINIC | Age: 46
End: 2021-04-29

## 2021-04-29 VITALS
OXYGEN SATURATION: 98 % | SYSTOLIC BLOOD PRESSURE: 136 MMHG | WEIGHT: 140 LBS | RESPIRATION RATE: 16 BRPM | HEIGHT: 63 IN | HEART RATE: 82 BPM | TEMPERATURE: 98 F | BODY MASS INDEX: 24.8 KG/M2 | DIASTOLIC BLOOD PRESSURE: 90 MMHG

## 2021-04-29 DIAGNOSIS — R03.0 ELEVATED BLOOD PRESSURE READING: ICD-10-CM

## 2021-04-29 DIAGNOSIS — E78.5 DYSLIPIDEMIA: ICD-10-CM

## 2021-04-29 DIAGNOSIS — R80.9 MICROALBUMINURIA: ICD-10-CM

## 2021-04-29 DIAGNOSIS — E11.9 CONTROLLED TYPE 2 DIABETES MELLITUS WITH INSULIN THERAPY (HCC): Primary | ICD-10-CM

## 2021-04-29 DIAGNOSIS — Z79.4 CONTROLLED TYPE 2 DIABETES MELLITUS WITH INSULIN THERAPY (HCC): Primary | ICD-10-CM

## 2021-04-29 DIAGNOSIS — E55.9 VITAMIN D DEFICIENCY: ICD-10-CM

## 2021-04-29 PROCEDURE — 99214 OFFICE O/P EST MOD 30 MIN: CPT | Performed by: INTERNAL MEDICINE

## 2021-04-29 PROCEDURE — 3008F BODY MASS INDEX DOCD: CPT | Performed by: INTERNAL MEDICINE

## 2021-04-29 PROCEDURE — 3075F SYST BP GE 130 - 139MM HG: CPT | Performed by: INTERNAL MEDICINE

## 2021-04-29 PROCEDURE — 90471 IMMUNIZATION ADMIN: CPT | Performed by: INTERNAL MEDICINE

## 2021-04-29 PROCEDURE — 90732 PPSV23 VACC 2 YRS+ SUBQ/IM: CPT | Performed by: INTERNAL MEDICINE

## 2021-04-29 PROCEDURE — 3080F DIAST BP >= 90 MM HG: CPT | Performed by: INTERNAL MEDICINE

## 2021-04-29 RX ORDER — LOSARTAN POTASSIUM 25 MG/1
25 TABLET ORAL DAILY
Qty: 90 TABLET | Refills: 0 | Status: SHIPPED | OUTPATIENT
Start: 2021-04-29 | End: 2021-07-12

## 2021-04-29 RX ORDER — ROSUVASTATIN CALCIUM 5 MG/1
5 TABLET, COATED ORAL NIGHTLY
Qty: 90 TABLET | Refills: 0 | Status: SHIPPED | OUTPATIENT
Start: 2021-04-29 | End: 2021-07-12

## 2021-04-29 NOTE — PROGRESS NOTES
Graham Dance D Nocon  5/28/1975    Patient presents with:  Establish Care: RG rm 1 New pt diabetes  Other: [facility] [address] [facility] [facility] [address] to get recent DM eye record. Jesse Ying is a 39year old female who presents to establish care.     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

## 2021-05-13 ENCOUNTER — LAB ENCOUNTER (OUTPATIENT)
Dept: LAB | Age: 46
End: 2021-05-13
Attending: INTERNAL MEDICINE
Payer: COMMERCIAL

## 2021-05-13 DIAGNOSIS — E55.9 VITAMIN D DEFICIENCY: ICD-10-CM

## 2021-05-13 DIAGNOSIS — R79.89 ABNORMAL CBC: ICD-10-CM

## 2021-05-13 DIAGNOSIS — R03.0 ELEVATED BLOOD PRESSURE READING: ICD-10-CM

## 2021-05-13 DIAGNOSIS — E78.5 DYSLIPIDEMIA: ICD-10-CM

## 2021-05-13 DIAGNOSIS — E11.9 CONTROLLED TYPE 2 DIABETES MELLITUS WITH INSULIN THERAPY (HCC): ICD-10-CM

## 2021-05-13 DIAGNOSIS — Z79.4 CONTROLLED TYPE 2 DIABETES MELLITUS WITH INSULIN THERAPY (HCC): ICD-10-CM

## 2021-05-13 PROCEDURE — 82570 ASSAY OF URINE CREATININE: CPT

## 2021-05-13 PROCEDURE — 3044F HG A1C LEVEL LT 7.0%: CPT | Performed by: INTERNAL MEDICINE

## 2021-05-13 PROCEDURE — 83540 ASSAY OF IRON: CPT

## 2021-05-13 PROCEDURE — 82043 UR ALBUMIN QUANTITATIVE: CPT

## 2021-05-13 PROCEDURE — 3061F NEG MICROALBUMINURIA REV: CPT | Performed by: INTERNAL MEDICINE

## 2021-05-13 PROCEDURE — 85025 COMPLETE CBC W/AUTO DIFF WBC: CPT

## 2021-05-13 PROCEDURE — 80053 COMPREHEN METABOLIC PANEL: CPT

## 2021-05-13 PROCEDURE — 82728 ASSAY OF FERRITIN: CPT

## 2021-05-13 PROCEDURE — 83036 HEMOGLOBIN GLYCOSYLATED A1C: CPT

## 2021-05-13 PROCEDURE — 80061 LIPID PANEL: CPT

## 2021-05-13 PROCEDURE — 36415 COLL VENOUS BLD VENIPUNCTURE: CPT

## 2021-05-13 PROCEDURE — 3060F POS MICROALBUMINURIA REV: CPT | Performed by: INTERNAL MEDICINE

## 2021-05-13 PROCEDURE — 83550 IRON BINDING TEST: CPT

## 2021-05-13 PROCEDURE — 82306 VITAMIN D 25 HYDROXY: CPT

## 2021-07-12 RX ORDER — ROSUVASTATIN CALCIUM 5 MG/1
TABLET, COATED ORAL
Qty: 90 TABLET | Refills: 0 | Status: SHIPPED | OUTPATIENT
Start: 2021-07-12 | End: 2021-10-25

## 2021-07-12 RX ORDER — LOSARTAN POTASSIUM 25 MG/1
TABLET ORAL
Qty: 90 TABLET | Refills: 0 | Status: SHIPPED | OUTPATIENT
Start: 2021-07-12 | End: 2021-10-25

## 2021-10-26 RX ORDER — LOSARTAN POTASSIUM 25 MG/1
25 TABLET ORAL DAILY
Qty: 90 TABLET | Refills: 0 | Status: SHIPPED | OUTPATIENT
Start: 2021-10-26

## 2021-10-26 RX ORDER — INSULIN ASPART 100 [IU]/ML
INJECTION, SUSPENSION SUBCUTANEOUS
Refills: 1 | OUTPATIENT
Start: 2021-10-26

## 2021-10-26 RX ORDER — ROSUVASTATIN CALCIUM 5 MG/1
5 TABLET, COATED ORAL NIGHTLY
Qty: 90 TABLET | Refills: 0 | Status: SHIPPED | OUTPATIENT
Start: 2021-10-26 | End: 2021-11-04

## 2021-10-26 NOTE — TELEPHONE ENCOUNTER
Last VISIT 04/29/21    Last CPE Not on file    Last REFILL Last on file 09/11/2018    Last LABS 05/13/21 Multiple labs done    Future Appointments   Date Time Provider Nazario Drew   11/4/2021 11:40 AM Yfn Albarran MD [facility] [address] [facility]         Per PROTOCOL? Not on protocol      Please Approve or Deny.

## 2021-11-04 ENCOUNTER — OFFICE VISIT (OUTPATIENT)
Dept: INTERNAL MEDICINE CLINIC | Facility: CLINIC | Age: 46
End: 2021-11-04
Payer: COMMERCIAL

## 2021-11-04 VITALS
TEMPERATURE: 97 F | WEIGHT: 139 LBS | HEIGHT: 63.78 IN | HEART RATE: 82 BPM | RESPIRATION RATE: 16 BRPM | SYSTOLIC BLOOD PRESSURE: 134 MMHG | BODY MASS INDEX: 24.02 KG/M2 | DIASTOLIC BLOOD PRESSURE: 62 MMHG | OXYGEN SATURATION: 97 %

## 2021-11-04 DIAGNOSIS — Z00.00 ROUTINE GENERAL MEDICAL EXAMINATION AT A HEALTH CARE FACILITY: Primary | ICD-10-CM

## 2021-11-04 DIAGNOSIS — D25.9 UTERINE LEIOMYOMA, UNSPECIFIED LOCATION: ICD-10-CM

## 2021-11-04 DIAGNOSIS — D50.0 IRON DEFICIENCY ANEMIA DUE TO CHRONIC BLOOD LOSS: ICD-10-CM

## 2021-11-04 DIAGNOSIS — E11.29 MICROALBUMINURIA DUE TO TYPE 2 DIABETES MELLITUS (HCC): ICD-10-CM

## 2021-11-04 DIAGNOSIS — Z13.29 SCREENING FOR THYROID DISORDER: ICD-10-CM

## 2021-11-04 DIAGNOSIS — E78.5 DYSLIPIDEMIA: ICD-10-CM

## 2021-11-04 DIAGNOSIS — E11.9 CONTROLLED TYPE 2 DIABETES MELLITUS WITH INSULIN THERAPY (HCC): ICD-10-CM

## 2021-11-04 DIAGNOSIS — Z13.0 SCREENING FOR BLOOD DISEASE: ICD-10-CM

## 2021-11-04 DIAGNOSIS — Z12.31 ENCOUNTER FOR SCREENING MAMMOGRAM FOR MALIGNANT NEOPLASM OF BREAST: ICD-10-CM

## 2021-11-04 DIAGNOSIS — R80.9 MICROALBUMINURIA DUE TO TYPE 2 DIABETES MELLITUS (HCC): ICD-10-CM

## 2021-11-04 DIAGNOSIS — Z79.4 CONTROLLED TYPE 2 DIABETES MELLITUS WITH INSULIN THERAPY (HCC): ICD-10-CM

## 2021-11-04 DIAGNOSIS — E04.1 RIGHT THYROID NODULE: ICD-10-CM

## 2021-11-04 PROBLEM — N60.19 FIBROCYSTIC BREAST CHANGES: Status: RESOLVED | Noted: 2019-05-17 | Resolved: 2021-11-04

## 2021-11-04 PROBLEM — R91.1 LUNG NODULE: Status: RESOLVED | Noted: 2019-12-10 | Resolved: 2021-11-04

## 2021-11-04 PROCEDURE — 3078F DIAST BP <80 MM HG: CPT | Performed by: INTERNAL MEDICINE

## 2021-11-04 PROCEDURE — 3008F BODY MASS INDEX DOCD: CPT | Performed by: INTERNAL MEDICINE

## 2021-11-04 PROCEDURE — 3075F SYST BP GE 130 - 139MM HG: CPT | Performed by: INTERNAL MEDICINE

## 2021-11-04 PROCEDURE — 99396 PREV VISIT EST AGE 40-64: CPT | Performed by: INTERNAL MEDICINE

## 2021-11-04 RX ORDER — ATORVASTATIN CALCIUM 10 MG/1
10 TABLET, FILM COATED ORAL NIGHTLY
Qty: 90 TABLET | Refills: 3 | Status: SHIPPED | OUTPATIENT
Start: 2021-11-04

## 2021-11-04 NOTE — PROGRESS NOTES
Ada Solares  5/28/1975    Patient presents with:  Diabetes: RG rm 7 f/u DM 6 month      HPI:   Narda Babinski is a 42year old female who presents for an annual physical examination. The patient has remained in her usual state of health.       She has m Microalbuminuria     Iron deficiency anemia     Fibroid uterus     Dyslipidemia     Fibrocystic breast changes     Thyroid nodule     Lung nodule     Past Surgical History:   Procedure Laterality Date   • ALEKSANDAR BIOPSY STEREO NODULE 1 SITE RIGHT (CPT=19081) continue  Microalbuminuria: Continue losartan 25 mg daily  Dyslipidemia: rosuvastatin 5 mg nightly discontinued.   Initiate atorvastatin 10 mg nightly; lipid panel ordered  Ophthalmologic evaluation is due next month  Laboratory evaluation pending collectio

## 2021-12-01 ENCOUNTER — LAB ENCOUNTER (OUTPATIENT)
Dept: LAB | Age: 46
End: 2021-12-01
Attending: INTERNAL MEDICINE
Payer: COMMERCIAL

## 2021-12-01 DIAGNOSIS — Z13.29 SCREENING FOR THYROID DISORDER: ICD-10-CM

## 2021-12-01 DIAGNOSIS — D25.9 UTERINE LEIOMYOMA, UNSPECIFIED LOCATION: ICD-10-CM

## 2021-12-01 DIAGNOSIS — Z13.0 SCREENING FOR BLOOD DISEASE: ICD-10-CM

## 2021-12-01 DIAGNOSIS — Z79.4 CONTROLLED TYPE 2 DIABETES MELLITUS WITH INSULIN THERAPY (HCC): ICD-10-CM

## 2021-12-01 DIAGNOSIS — D50.0 IRON DEFICIENCY ANEMIA DUE TO CHRONIC BLOOD LOSS: ICD-10-CM

## 2021-12-01 DIAGNOSIS — Z00.00 ROUTINE GENERAL MEDICAL EXAMINATION AT A HEALTH CARE FACILITY: ICD-10-CM

## 2021-12-01 DIAGNOSIS — E78.5 DYSLIPIDEMIA: ICD-10-CM

## 2021-12-01 DIAGNOSIS — E11.9 CONTROLLED TYPE 2 DIABETES MELLITUS WITH INSULIN THERAPY (HCC): ICD-10-CM

## 2021-12-01 PROCEDURE — 82728 ASSAY OF FERRITIN: CPT | Performed by: INTERNAL MEDICINE

## 2021-12-01 PROCEDURE — 83550 IRON BINDING TEST: CPT | Performed by: INTERNAL MEDICINE

## 2021-12-01 PROCEDURE — 80050 GENERAL HEALTH PANEL: CPT | Performed by: INTERNAL MEDICINE

## 2021-12-01 PROCEDURE — 80061 LIPID PANEL: CPT | Performed by: INTERNAL MEDICINE

## 2021-12-01 PROCEDURE — 83036 HEMOGLOBIN GLYCOSYLATED A1C: CPT | Performed by: INTERNAL MEDICINE

## 2021-12-01 PROCEDURE — 83540 ASSAY OF IRON: CPT | Performed by: INTERNAL MEDICINE

## 2021-12-07 ENCOUNTER — HOSPITAL ENCOUNTER (OUTPATIENT)
Dept: MAMMOGRAPHY | Age: 46
Discharge: HOME | End: 2021-12-07
Attending: INTERNAL MEDICINE
Payer: COMMERCIAL

## 2021-12-07 DIAGNOSIS — Z12.31 ENCOUNTER FOR SCREENING MAMMOGRAM FOR MALIGNANT NEOPLASM OF BREAST: ICD-10-CM

## 2021-12-07 PROCEDURE — 77067 SCR MAMMO BI INCL CAD: CPT | Performed by: INTERNAL MEDICINE

## 2022-03-21 RX ORDER — INSULIN ASPART 100 [IU]/ML
INJECTION, SUSPENSION SUBCUTANEOUS
Qty: 45 ML | Refills: 1 | Status: SHIPPED | OUTPATIENT
Start: 2022-03-21

## 2022-03-21 NOTE — TELEPHONE ENCOUNTER
Last VISIT 11/04/21    Last CPE 11/04/21    Last REFILL 10/26/21 qty 45 w/1 refill    Last LABS 12/01/21 Multiple labs done    No future appointments. Please Approve or Deny.

## 2022-04-15 RX ORDER — LOSARTAN POTASSIUM 25 MG/1
TABLET ORAL
Qty: 90 TABLET | Refills: 0 | Status: SHIPPED | OUTPATIENT
Start: 2022-04-15

## 2022-07-19 RX ORDER — LOSARTAN POTASSIUM 25 MG/1
TABLET ORAL
Qty: 90 TABLET | Refills: 0 | Status: SHIPPED | OUTPATIENT
Start: 2022-07-19

## 2022-09-02 ENCOUNTER — PATIENT MESSAGE (OUTPATIENT)
Dept: INTERNAL MEDICINE CLINIC | Facility: CLINIC | Age: 47
End: 2022-09-02

## 2022-09-02 RX ORDER — INSULIN ASPART 100 [IU]/ML
26 INJECTION, SUSPENSION SUBCUTANEOUS 2 TIMES DAILY
Qty: 45 ML | Refills: 1 | Status: SHIPPED | OUTPATIENT
Start: 2022-09-02

## 2022-09-02 RX ORDER — SEMAGLUTIDE 1.34 MG/ML
1 INJECTION, SOLUTION SUBCUTANEOUS WEEKLY
Qty: 3 ML | Refills: 0 | Status: SHIPPED | OUTPATIENT
Start: 2022-09-02

## 2022-09-02 NOTE — TELEPHONE ENCOUNTER
Future Appointments   Date Time Provider Nazario Viki   9/15/2022 10:40 AM Macario Adams MD [facility] [address] [facility]

## 2022-09-02 NOTE — TELEPHONE ENCOUNTER
Last VISIT 11/04/21    Last CPE 11/04/21    Last REFILL 01/28/22 qty 3 mL w/0 refills    Last LABS 12/01/22 CPE labs done    Future Appointments   Date Time Provider Nazario Drew      REF [facility]17 Ref PFLD 17   9/15/2022 10:40 AM Obie Booth MD [facility] [address] [facility]       Please Approve or Deny.

## 2022-09-02 NOTE — TELEPHONE ENCOUNTER
From: Flako Solares  To: Roman Caldwell MD  Sent: 9/2/2022 9:59 AM CDT  Subject: Ozempic    Trying to request refill on Ozempic but Mychart will not allow me.

## 2022-09-13 ENCOUNTER — LAB ENCOUNTER (OUTPATIENT)
Dept: LAB | Age: 47
End: 2022-09-13
Attending: INTERNAL MEDICINE
Payer: COMMERCIAL

## 2022-09-13 DIAGNOSIS — E11.29 TYPE 2 DIABETES MELLITUS WITH MICROALBUMINURIA, WITHOUT LONG-TERM CURRENT USE OF INSULIN (HCC): ICD-10-CM

## 2022-09-13 DIAGNOSIS — R80.9 TYPE 2 DIABETES MELLITUS WITH MICROALBUMINURIA, WITHOUT LONG-TERM CURRENT USE OF INSULIN (HCC): ICD-10-CM

## 2022-09-13 LAB
ALBUMIN SERPL-MCNC: 3.9 G/DL (ref 3.4–5)
ALBUMIN/GLOB SERPL: 1.1 {RATIO} (ref 1–2)
ALP LIVER SERPL-CCNC: 65 U/L
ALT SERPL-CCNC: 25 U/L
ANION GAP SERPL CALC-SCNC: 6 MMOL/L (ref 0–18)
AST SERPL-CCNC: 19 U/L (ref 15–37)
BILIRUB SERPL-MCNC: 0.6 MG/DL (ref 0.1–2)
BUN BLD-MCNC: 11 MG/DL (ref 7–18)
CALCIUM BLD-MCNC: 9.2 MG/DL (ref 8.5–10.1)
CHLORIDE SERPL-SCNC: 104 MMOL/L (ref 98–112)
CHOLEST SERPL-MCNC: 96 MG/DL (ref ?–200)
CO2 SERPL-SCNC: 26 MMOL/L (ref 21–32)
CREAT BLD-MCNC: 0.54 MG/DL
CREAT UR-SCNC: 165 MG/DL
EST. AVERAGE GLUCOSE BLD GHB EST-MCNC: 189 MG/DL (ref 68–126)
FASTING PATIENT LIPID ANSWER: YES
FASTING STATUS PATIENT QL REPORTED: YES
GFR SERPLBLD BASED ON 1.73 SQ M-ARVRAT: 116 ML/MIN/1.73M2 (ref 60–?)
GLOBULIN PLAS-MCNC: 3.7 G/DL (ref 2.8–4.4)
GLUCOSE BLD-MCNC: 159 MG/DL (ref 70–99)
HBA1C MFR BLD: 8.2 % (ref ?–5.7)
HDLC SERPL-MCNC: 36 MG/DL (ref 40–59)
LDLC SERPL CALC-MCNC: 39 MG/DL (ref ?–100)
MICROALBUMIN UR-MCNC: 3.21 MG/DL
MICROALBUMIN/CREAT 24H UR-RTO: 19.5 UG/MG (ref ?–30)
NONHDLC SERPL-MCNC: 60 MG/DL (ref ?–130)
OSMOLALITY SERPL CALC.SUM OF ELEC: 285 MOSM/KG (ref 275–295)
POTASSIUM SERPL-SCNC: 4 MMOL/L (ref 3.5–5.1)
PROT SERPL-MCNC: 7.6 G/DL (ref 6.4–8.2)
SODIUM SERPL-SCNC: 136 MMOL/L (ref 136–145)
TRIGL SERPL-MCNC: 113 MG/DL (ref 30–149)
VLDLC SERPL CALC-MCNC: 16 MG/DL (ref 0–30)

## 2022-09-13 PROCEDURE — 80061 LIPID PANEL: CPT | Performed by: INTERNAL MEDICINE

## 2022-09-13 PROCEDURE — 3052F HG A1C>EQUAL 8.0%<EQUAL 9.0%: CPT | Performed by: INTERNAL MEDICINE

## 2022-09-13 PROCEDURE — 3061F NEG MICROALBUMINURIA REV: CPT | Performed by: INTERNAL MEDICINE

## 2022-09-13 PROCEDURE — 82570 ASSAY OF URINE CREATININE: CPT | Performed by: INTERNAL MEDICINE

## 2022-09-13 PROCEDURE — 80053 COMPREHEN METABOLIC PANEL: CPT | Performed by: INTERNAL MEDICINE

## 2022-09-13 PROCEDURE — 82043 UR ALBUMIN QUANTITATIVE: CPT | Performed by: INTERNAL MEDICINE

## 2022-09-13 PROCEDURE — 83036 HEMOGLOBIN GLYCOSYLATED A1C: CPT | Performed by: INTERNAL MEDICINE

## 2022-09-15 ENCOUNTER — OFFICE VISIT (OUTPATIENT)
Dept: INTERNAL MEDICINE CLINIC | Facility: CLINIC | Age: 47
End: 2022-09-15
Payer: COMMERCIAL

## 2022-09-15 VITALS
HEIGHT: 63.5 IN | RESPIRATION RATE: 16 BRPM | HEART RATE: 90 BPM | BODY MASS INDEX: 23.62 KG/M2 | SYSTOLIC BLOOD PRESSURE: 124 MMHG | TEMPERATURE: 98 F | OXYGEN SATURATION: 99 % | WEIGHT: 135 LBS | DIASTOLIC BLOOD PRESSURE: 86 MMHG

## 2022-09-15 DIAGNOSIS — Z12.31 ENCOUNTER FOR SCREENING MAMMOGRAM FOR MALIGNANT NEOPLASM OF BREAST: ICD-10-CM

## 2022-09-15 DIAGNOSIS — Z79.4 TYPE 2 DIABETES MELLITUS WITHOUT COMPLICATION, WITH LONG-TERM CURRENT USE OF INSULIN (HCC): Primary | ICD-10-CM

## 2022-09-15 DIAGNOSIS — E11.9 TYPE 2 DIABETES MELLITUS WITHOUT COMPLICATION, WITH LONG-TERM CURRENT USE OF INSULIN (HCC): Primary | ICD-10-CM

## 2022-09-15 DIAGNOSIS — L30.9 ECZEMA, UNSPECIFIED TYPE: ICD-10-CM

## 2022-09-15 DIAGNOSIS — E78.5 DYSLIPIDEMIA: ICD-10-CM

## 2022-09-15 DIAGNOSIS — Z12.11 SCREEN FOR COLON CANCER: ICD-10-CM

## 2022-09-15 PROBLEM — R80.9 MICROALBUMINURIA DUE TO TYPE 2 DIABETES MELLITUS (HCC): Status: RESOLVED | Noted: 2021-11-04 | Resolved: 2022-09-15

## 2022-09-15 PROBLEM — E11.29 MICROALBUMINURIA DUE TO TYPE 2 DIABETES MELLITUS: Status: RESOLVED | Noted: 2021-11-04 | Resolved: 2022-09-15

## 2022-09-15 PROBLEM — R80.9 MICROALBUMINURIA DUE TO TYPE 2 DIABETES MELLITUS: Status: RESOLVED | Noted: 2021-11-04 | Resolved: 2022-09-15

## 2022-09-15 PROBLEM — E11.29 MICROALBUMINURIA DUE TO TYPE 2 DIABETES MELLITUS (HCC): Status: RESOLVED | Noted: 2021-11-04 | Resolved: 2022-09-15

## 2022-09-15 PROCEDURE — 99214 OFFICE O/P EST MOD 30 MIN: CPT | Performed by: INTERNAL MEDICINE

## 2022-09-15 PROCEDURE — 3008F BODY MASS INDEX DOCD: CPT | Performed by: INTERNAL MEDICINE

## 2022-09-15 PROCEDURE — 3079F DIAST BP 80-89 MM HG: CPT | Performed by: INTERNAL MEDICINE

## 2022-09-15 PROCEDURE — 3074F SYST BP LT 130 MM HG: CPT | Performed by: INTERNAL MEDICINE

## 2022-09-15 RX ORDER — GLIPIZIDE 10 MG/1
10 TABLET, FILM COATED, EXTENDED RELEASE ORAL DAILY
Qty: 90 TABLET | Refills: 0 | Status: SHIPPED | OUTPATIENT
Start: 2022-09-15

## 2022-09-15 RX ORDER — TRIAMCINOLONE ACETONIDE 1 MG/G
CREAM TOPICAL 2 TIMES DAILY PRN
Qty: 60 G | Refills: 3 | Status: SHIPPED | OUTPATIENT
Start: 2022-09-15

## 2022-09-15 RX ORDER — RIBOFLAVIN (VITAMIN B2) 100 MG
100 TABLET ORAL DAILY
COMMUNITY

## 2022-10-10 ENCOUNTER — IMMUNIZATION (OUTPATIENT)
Dept: LAB | Facility: HOSPITAL | Age: 47
End: 2022-10-10
Attending: PREVENTIVE MEDICINE
Payer: COMMERCIAL

## 2022-10-10 DIAGNOSIS — Z23 NEED FOR VACCINATION: Primary | ICD-10-CM

## 2022-10-10 PROCEDURE — 90471 IMMUNIZATION ADMIN: CPT

## 2022-10-19 ENCOUNTER — PATIENT MESSAGE (OUTPATIENT)
Dept: INTERNAL MEDICINE CLINIC | Facility: CLINIC | Age: 47
End: 2022-10-19

## 2022-10-19 DIAGNOSIS — Z79.4 CONTROLLED TYPE 2 DIABETES MELLITUS WITH INSULIN THERAPY (HCC): ICD-10-CM

## 2022-10-19 DIAGNOSIS — E11.9 CONTROLLED TYPE 2 DIABETES MELLITUS WITH INSULIN THERAPY (HCC): ICD-10-CM

## 2022-10-20 RX ORDER — SEMAGLUTIDE 1.34 MG/ML
1 INJECTION, SOLUTION SUBCUTANEOUS WEEKLY
Qty: 3 ML | Refills: 0 | Status: SHIPPED | OUTPATIENT
Start: 2022-10-20

## 2022-10-20 NOTE — TELEPHONE ENCOUNTER
From: Carla Solares  To: Olivia Rasmussen MD  Sent: 10/19/2022 10:20 AM CDT  Subject: Medication Refill    Attempting to refill Metformin but Mychart is not allowing me to.

## 2022-10-20 NOTE — TELEPHONE ENCOUNTER
Last VISIT 09/15/22    Last CPE 11/04/21    Last REFILL 09/02/22 qty 3 mL w/0 refills    Last LABS 09/13/22 DM las done    No future appointments. Per PROTOCOL? Failed       Please Approve or Deny.

## 2022-12-06 RX ORDER — GLIPIZIDE 10 MG/1
TABLET, FILM COATED, EXTENDED RELEASE ORAL
Qty: 90 TABLET | Refills: 0 | Status: SHIPPED | OUTPATIENT
Start: 2022-12-06

## 2022-12-06 NOTE — TELEPHONE ENCOUNTER
Last VISIT 09/15/22    Last CPE 11/04/21    Last REFILL 09/15/22 qty 90 w/0 refills    Last LABS 09/13/22 DM labs done    No future appointments. Per PROTOCOL? Failed       Please Approve or Deny.

## 2022-12-09 RX ORDER — LOSARTAN POTASSIUM 25 MG/1
TABLET ORAL
Qty: 90 TABLET | Refills: 0 | Status: SHIPPED | OUTPATIENT
Start: 2022-12-09

## 2022-12-13 RX ORDER — SEMAGLUTIDE 1.34 MG/ML
1 INJECTION, SOLUTION SUBCUTANEOUS WEEKLY
Qty: 9 ML | Refills: 0 | Status: SHIPPED | OUTPATIENT
Start: 2022-12-13 | End: 2023-03-13

## 2022-12-13 NOTE — TELEPHONE ENCOUNTER
Last VISIT 09/15/22    Last CPE 11/04/21     Last REFILL 10/20/22 qty 3 mL w/0 refills    Last LABS 09/13/22 DM labs done     No future appointments. Per PROTOCOL? Not on protocol      Please Approve or Deny.

## 2022-12-13 NOTE — TELEPHONE ENCOUNTER
Please advise to complete blood work within two weeks to ensure improvement in blood glucose control.

## 2023-01-21 DIAGNOSIS — Z79.4 CONTROLLED TYPE 2 DIABETES MELLITUS WITH INSULIN THERAPY (HCC): ICD-10-CM

## 2023-01-21 DIAGNOSIS — E11.9 CONTROLLED TYPE 2 DIABETES MELLITUS WITH INSULIN THERAPY (HCC): ICD-10-CM

## 2023-01-23 NOTE — TELEPHONE ENCOUNTER
Diabetic Medication Protocol Failed 01/21/2023 08:58 AM   Protocol Details  Last HgBA1C < 7.5    HgBA1C procedure resulted in past 6 months    Microalbumin procedure in past 12 months or taking ACE/ARB    Appointment in past 6 or next 3 months        LOV 9/15/22 ad     LAST LAB  9/13/22     LAST RX 10/20/22 180 tabs     Next OV No future appointments.       PROTOCOL failed

## 2023-03-06 RX ORDER — LOSARTAN POTASSIUM 25 MG/1
TABLET ORAL
Qty: 90 TABLET | Refills: 0 | Status: SHIPPED | OUTPATIENT
Start: 2023-03-06

## 2023-03-06 RX ORDER — GLIPIZIDE 10 MG/1
TABLET, FILM COATED, EXTENDED RELEASE ORAL
Qty: 90 TABLET | Refills: 0 | Status: SHIPPED | OUTPATIENT
Start: 2023-03-06

## 2023-03-06 RX ORDER — ATORVASTATIN CALCIUM 10 MG/1
TABLET, FILM COATED ORAL
Qty: 90 TABLET | Refills: 0 | Status: SHIPPED | OUTPATIENT
Start: 2023-03-06

## 2023-03-06 NOTE — TELEPHONE ENCOUNTER
Last visit- 09/15/2022 type 2 diabetes seen by AD    Last refill- 12/06/2022 glipizide er 10mg QTY90 0R,  12/09/2022 losartan 25mg QTY90 0R    Last labs- 09/13/2022 microalb, hemoglobin a1c, lipid, cmp     No future appointments. Per Protocol?   Please approve or deny

## 2023-03-06 NOTE — TELEPHONE ENCOUNTER
Last visit- 09/15/2022 type 2 diabetes seen by AD    Last refill- 12/05/2022 atorvastatin 10mg QTY90 0R    Last labs- 09/13/2022 microalb, hemoglobin a1c, lipid, cmp     No future appointments.     Per Protocol- PAssed

## 2023-03-20 ENCOUNTER — LAB ENCOUNTER (OUTPATIENT)
Dept: LAB | Age: 48
End: 2023-03-20
Attending: INTERNAL MEDICINE
Payer: COMMERCIAL

## 2023-03-20 DIAGNOSIS — E11.9 TYPE 2 DIABETES MELLITUS WITHOUT COMPLICATION, WITH LONG-TERM CURRENT USE OF INSULIN (HCC): ICD-10-CM

## 2023-03-20 DIAGNOSIS — Z79.4 TYPE 2 DIABETES MELLITUS WITHOUT COMPLICATION, WITH LONG-TERM CURRENT USE OF INSULIN (HCC): ICD-10-CM

## 2023-03-20 LAB
ALBUMIN SERPL-MCNC: 3.3 G/DL (ref 3.4–5)
ALBUMIN/GLOB SERPL: 0.9 {RATIO} (ref 1–2)
ALP LIVER SERPL-CCNC: 64 U/L
ALT SERPL-CCNC: 34 U/L
ANION GAP SERPL CALC-SCNC: 7 MMOL/L (ref 0–18)
AST SERPL-CCNC: 18 U/L (ref 15–37)
BILIRUB SERPL-MCNC: 0.4 MG/DL (ref 0.1–2)
BUN BLD-MCNC: 11 MG/DL (ref 7–18)
CALCIUM BLD-MCNC: 8.7 MG/DL (ref 8.5–10.1)
CHLORIDE SERPL-SCNC: 105 MMOL/L (ref 98–112)
CHOLEST SERPL-MCNC: 88 MG/DL (ref ?–200)
CO2 SERPL-SCNC: 26 MMOL/L (ref 21–32)
CREAT BLD-MCNC: 0.52 MG/DL
EST. AVERAGE GLUCOSE BLD GHB EST-MCNC: 177 MG/DL (ref 68–126)
FASTING PATIENT LIPID ANSWER: YES
FASTING STATUS PATIENT QL REPORTED: YES
GFR SERPLBLD BASED ON 1.73 SQ M-ARVRAT: 117 ML/MIN/1.73M2 (ref 60–?)
GLOBULIN PLAS-MCNC: 3.6 G/DL (ref 2.8–4.4)
GLUCOSE BLD-MCNC: 118 MG/DL (ref 70–99)
HBA1C MFR BLD: 7.8 % (ref ?–5.7)
HDLC SERPL-MCNC: 39 MG/DL (ref 40–59)
LDLC SERPL CALC-MCNC: 32 MG/DL (ref ?–100)
NONHDLC SERPL-MCNC: 49 MG/DL (ref ?–130)
OSMOLALITY SERPL CALC.SUM OF ELEC: 286 MOSM/KG (ref 275–295)
POTASSIUM SERPL-SCNC: 3.9 MMOL/L (ref 3.5–5.1)
PROT SERPL-MCNC: 6.9 G/DL (ref 6.4–8.2)
SODIUM SERPL-SCNC: 138 MMOL/L (ref 136–145)
TRIGL SERPL-MCNC: 87 MG/DL (ref 30–149)
VLDLC SERPL CALC-MCNC: 12 MG/DL (ref 0–30)

## 2023-03-20 PROCEDURE — 80053 COMPREHEN METABOLIC PANEL: CPT | Performed by: INTERNAL MEDICINE

## 2023-03-20 PROCEDURE — 80061 LIPID PANEL: CPT | Performed by: INTERNAL MEDICINE

## 2023-03-20 PROCEDURE — 83036 HEMOGLOBIN GLYCOSYLATED A1C: CPT | Performed by: INTERNAL MEDICINE

## 2023-03-20 PROCEDURE — 3051F HG A1C>EQUAL 7.0%<8.0%: CPT | Performed by: INTERNAL MEDICINE

## 2023-03-21 ENCOUNTER — HOSPITAL ENCOUNTER (OUTPATIENT)
Dept: MAMMOGRAPHY | Age: 48
Discharge: HOME | End: 2023-03-21
Attending: INTERNAL MEDICINE
Payer: COMMERCIAL

## 2023-03-21 DIAGNOSIS — Z12.31 ENCOUNTER FOR SCREENING MAMMOGRAM FOR MALIGNANT NEOPLASM OF BREAST: ICD-10-CM

## 2023-03-21 PROCEDURE — 77063 BREAST TOMOSYNTHESIS BI: CPT | Performed by: INTERNAL MEDICINE

## 2023-03-21 PROCEDURE — 77067 SCR MAMMO BI INCL CAD: CPT | Performed by: INTERNAL MEDICINE

## 2023-03-21 RX ORDER — SEMAGLUTIDE 1.34 MG/ML
INJECTION, SOLUTION SUBCUTANEOUS
Qty: 9 ML | Refills: 0 | Status: SHIPPED | OUTPATIENT
Start: 2023-03-21

## 2023-03-21 RX ORDER — INSULIN ASPART 100 [IU]/ML
26 INJECTION, SUSPENSION SUBCUTANEOUS 2 TIMES DAILY
Qty: 45 ML | Refills: 1 | Status: SHIPPED | OUTPATIENT
Start: 2023-03-21

## 2023-03-21 NOTE — TELEPHONE ENCOUNTER
Last visit- 11/04/2021 cpe seen by AD    Last refill- 12/13/2022 ozempic 1mg QTY9ml 0R,  09/02/2022 novolog mix 70/30 [id number] 1R    Last labs- 03/20/2021 hemoglobin a1c, lipid, cmp     No future appointments. Per Protocol?   Please approve or deny

## 2023-06-09 RX ORDER — ATORVASTATIN CALCIUM 10 MG/1
TABLET, FILM COATED ORAL
Qty: 90 TABLET | Refills: 0 | Status: SHIPPED | OUTPATIENT
Start: 2023-06-09

## 2023-06-12 RX ORDER — GLIPIZIDE 10 MG/1
TABLET, FILM COATED, EXTENDED RELEASE ORAL
Qty: 90 TABLET | Refills: 0 | Status: SHIPPED | OUTPATIENT
Start: 2023-06-12

## 2023-06-12 RX ORDER — SEMAGLUTIDE 1.34 MG/ML
1 INJECTION, SOLUTION SUBCUTANEOUS WEEKLY
Qty: 3 ML | Refills: 0 | Status: SHIPPED | OUTPATIENT
Start: 2023-06-12 | End: 2023-07-12

## 2023-06-12 RX ORDER — LOSARTAN POTASSIUM 25 MG/1
TABLET ORAL
Qty: 90 TABLET | Refills: 0 | Status: SHIPPED | OUTPATIENT
Start: 2023-06-12

## 2023-07-06 ENCOUNTER — OFFICE VISIT (OUTPATIENT)
Dept: INTERNAL MEDICINE CLINIC | Facility: CLINIC | Age: 48
End: 2023-07-06
Payer: COMMERCIAL

## 2023-07-06 VITALS
HEIGHT: 63.5 IN | RESPIRATION RATE: 18 BRPM | WEIGHT: 138 LBS | SYSTOLIC BLOOD PRESSURE: 106 MMHG | DIASTOLIC BLOOD PRESSURE: 70 MMHG | BODY MASS INDEX: 24.15 KG/M2 | HEART RATE: 78 BPM | TEMPERATURE: 97 F | OXYGEN SATURATION: 98 %

## 2023-07-06 DIAGNOSIS — Z79.4 TYPE 2 DIABETES MELLITUS WITHOUT COMPLICATION, WITH LONG-TERM CURRENT USE OF INSULIN (HCC): ICD-10-CM

## 2023-07-06 DIAGNOSIS — E78.00 PURE HYPERCHOLESTEROLEMIA: ICD-10-CM

## 2023-07-06 DIAGNOSIS — Z00.00 ROUTINE GENERAL MEDICAL EXAMINATION AT A HEALTH CARE FACILITY: Primary | ICD-10-CM

## 2023-07-06 DIAGNOSIS — E04.1 RIGHT THYROID NODULE: ICD-10-CM

## 2023-07-06 DIAGNOSIS — D25.9 UTERINE LEIOMYOMA, UNSPECIFIED LOCATION: ICD-10-CM

## 2023-07-06 DIAGNOSIS — L30.9 ECZEMA, UNSPECIFIED TYPE: ICD-10-CM

## 2023-07-06 DIAGNOSIS — Z13.228 SCREENING FOR METABOLIC DISORDER: ICD-10-CM

## 2023-07-06 DIAGNOSIS — E11.9 TYPE 2 DIABETES MELLITUS WITHOUT COMPLICATION, WITH LONG-TERM CURRENT USE OF INSULIN (HCC): ICD-10-CM

## 2023-07-06 DIAGNOSIS — D50.0 IRON DEFICIENCY ANEMIA DUE TO CHRONIC BLOOD LOSS: ICD-10-CM

## 2023-07-06 DIAGNOSIS — Z13.29 THYROID DISORDER SCREENING: ICD-10-CM

## 2023-07-06 DIAGNOSIS — Z13.0 SCREENING FOR BLOOD DISEASE: ICD-10-CM

## 2023-07-06 PROCEDURE — 3074F SYST BP LT 130 MM HG: CPT | Performed by: INTERNAL MEDICINE

## 2023-07-06 PROCEDURE — 90471 IMMUNIZATION ADMIN: CPT | Performed by: INTERNAL MEDICINE

## 2023-07-06 PROCEDURE — 99396 PREV VISIT EST AGE 40-64: CPT | Performed by: INTERNAL MEDICINE

## 2023-07-06 PROCEDURE — 3078F DIAST BP <80 MM HG: CPT | Performed by: INTERNAL MEDICINE

## 2023-07-06 PROCEDURE — 3008F BODY MASS INDEX DOCD: CPT | Performed by: INTERNAL MEDICINE

## 2023-07-06 PROCEDURE — 90677 PCV20 VACCINE IM: CPT | Performed by: INTERNAL MEDICINE

## 2023-07-06 RX ORDER — MULTIVIT WITH MINERALS/LUTEIN
1000 TABLET ORAL DAILY
COMMUNITY

## 2023-07-18 ENCOUNTER — TELEPHONE (OUTPATIENT)
Dept: INTERNAL MEDICINE CLINIC | Facility: CLINIC | Age: 48
End: 2023-07-18

## 2023-07-27 ENCOUNTER — LAB ENCOUNTER (OUTPATIENT)
Dept: LAB | Age: 48
End: 2023-07-27
Attending: INTERNAL MEDICINE
Payer: COMMERCIAL

## 2023-07-27 DIAGNOSIS — D50.0 IRON DEFICIENCY ANEMIA DUE TO CHRONIC BLOOD LOSS: ICD-10-CM

## 2023-07-27 DIAGNOSIS — Z13.29 THYROID DISORDER SCREENING: ICD-10-CM

## 2023-07-27 DIAGNOSIS — Z00.00 ROUTINE GENERAL MEDICAL EXAMINATION AT A HEALTH CARE FACILITY: ICD-10-CM

## 2023-07-27 DIAGNOSIS — Z13.228 SCREENING FOR METABOLIC DISORDER: ICD-10-CM

## 2023-07-27 DIAGNOSIS — E78.00 PURE HYPERCHOLESTEROLEMIA: ICD-10-CM

## 2023-07-27 DIAGNOSIS — Z13.0 SCREENING FOR BLOOD DISEASE: ICD-10-CM

## 2023-07-27 DIAGNOSIS — E11.9 TYPE 2 DIABETES MELLITUS WITHOUT COMPLICATION, WITH LONG-TERM CURRENT USE OF INSULIN (HCC): ICD-10-CM

## 2023-07-27 DIAGNOSIS — Z79.4 TYPE 2 DIABETES MELLITUS WITHOUT COMPLICATION, WITH LONG-TERM CURRENT USE OF INSULIN (HCC): ICD-10-CM

## 2023-07-27 LAB
ALBUMIN SERPL-MCNC: 3.1 G/DL (ref 3.4–5)
ALBUMIN/GLOB SERPL: 0.9 {RATIO} (ref 1–2)
ALP LIVER SERPL-CCNC: 61 U/L
ALT SERPL-CCNC: 53 U/L
ANION GAP SERPL CALC-SCNC: 5 MMOL/L (ref 0–18)
AST SERPL-CCNC: 20 U/L (ref 15–37)
BASOPHILS # BLD AUTO: 0.06 X10(3) UL (ref 0–0.2)
BASOPHILS NFR BLD AUTO: 0.9 %
BILIRUB SERPL-MCNC: 0.5 MG/DL (ref 0.1–2)
BUN BLD-MCNC: 9 MG/DL (ref 7–18)
CALCIUM BLD-MCNC: 8.4 MG/DL (ref 8.5–10.1)
CHLORIDE SERPL-SCNC: 108 MMOL/L (ref 98–112)
CHOLEST SERPL-MCNC: 86 MG/DL (ref ?–200)
CO2 SERPL-SCNC: 26 MMOL/L (ref 21–32)
CREAT BLD-MCNC: 0.54 MG/DL
DEPRECATED HBV CORE AB SER IA-ACNC: 5.7 NG/ML
EGFRCR SERPLBLD CKD-EPI 2021: 115 ML/MIN/1.73M2 (ref 60–?)
EOSINOPHIL # BLD AUTO: 0.19 X10(3) UL (ref 0–0.7)
EOSINOPHIL NFR BLD AUTO: 2.7 %
ERYTHROCYTE [DISTWIDTH] IN BLOOD BY AUTOMATED COUNT: 14.8 %
EST. AVERAGE GLUCOSE BLD GHB EST-MCNC: 189 MG/DL (ref 68–126)
FASTING PATIENT LIPID ANSWER: YES
FASTING STATUS PATIENT QL REPORTED: YES
GLOBULIN PLAS-MCNC: 3.5 G/DL (ref 2.8–4.4)
GLUCOSE BLD-MCNC: 122 MG/DL (ref 70–99)
HBA1C MFR BLD: 8.2 % (ref ?–5.7)
HCT VFR BLD AUTO: 34.3 %
HDLC SERPL-MCNC: 40 MG/DL (ref 40–59)
HGB BLD-MCNC: 10.4 G/DL
IMM GRANULOCYTES # BLD AUTO: 0.01 X10(3) UL (ref 0–1)
IMM GRANULOCYTES NFR BLD: 0.1 %
IRON SATN MFR SERPL: 7 %
IRON SERPL-MCNC: 33 UG/DL
LDLC SERPL CALC-MCNC: 30 MG/DL (ref ?–100)
LYMPHOCYTES # BLD AUTO: 1.96 X10(3) UL (ref 1–4)
LYMPHOCYTES NFR BLD AUTO: 28.1 %
MCH RBC QN AUTO: 25.4 PG (ref 26–34)
MCHC RBC AUTO-ENTMCNC: 30.3 G/DL (ref 31–37)
MCV RBC AUTO: 83.7 FL
MONOCYTES # BLD AUTO: 0.65 X10(3) UL (ref 0.1–1)
MONOCYTES NFR BLD AUTO: 9.3 %
NEUTROPHILS # BLD AUTO: 4.1 X10 (3) UL (ref 1.5–7.7)
NEUTROPHILS # BLD AUTO: 4.1 X10(3) UL (ref 1.5–7.7)
NEUTROPHILS NFR BLD AUTO: 58.9 %
NONHDLC SERPL-MCNC: 46 MG/DL (ref ?–130)
OSMOLALITY SERPL CALC.SUM OF ELEC: 288 MOSM/KG (ref 275–295)
PLATELET # BLD AUTO: 391 10(3)UL (ref 150–450)
POTASSIUM SERPL-SCNC: 4.1 MMOL/L (ref 3.5–5.1)
PROT SERPL-MCNC: 6.6 G/DL (ref 6.4–8.2)
RBC # BLD AUTO: 4.1 X10(6)UL
SODIUM SERPL-SCNC: 139 MMOL/L (ref 136–145)
TIBC SERPL-MCNC: 450 UG/DL (ref 240–450)
TRANSFERRIN SERPL-MCNC: 302 MG/DL (ref 200–360)
TRIGL SERPL-MCNC: 72 MG/DL (ref 30–149)
TSI SER-ACNC: 1.14 MIU/ML (ref 0.36–3.74)
VLDLC SERPL CALC-MCNC: 10 MG/DL (ref 0–30)
WBC # BLD AUTO: 7 X10(3) UL (ref 4–11)

## 2023-07-27 PROCEDURE — 82728 ASSAY OF FERRITIN: CPT | Performed by: INTERNAL MEDICINE

## 2023-07-27 PROCEDURE — 83036 HEMOGLOBIN GLYCOSYLATED A1C: CPT | Performed by: INTERNAL MEDICINE

## 2023-07-27 PROCEDURE — 83550 IRON BINDING TEST: CPT | Performed by: INTERNAL MEDICINE

## 2023-07-27 PROCEDURE — 80061 LIPID PANEL: CPT | Performed by: INTERNAL MEDICINE

## 2023-07-27 PROCEDURE — 83540 ASSAY OF IRON: CPT | Performed by: INTERNAL MEDICINE

## 2023-07-27 PROCEDURE — 80050 GENERAL HEALTH PANEL: CPT | Performed by: INTERNAL MEDICINE

## 2023-07-31 NOTE — PROGRESS NOTES
I would recommend for her to stay on ozempic 1 mg weekly provided it remains covered. Increase Novolog from 22 units BID to 26 units BID with 2 unit increments every week.

## 2023-08-01 RX ORDER — SEMAGLUTIDE 1.34 MG/ML
1 INJECTION, SOLUTION SUBCUTANEOUS WEEKLY
Qty: 3 ML | Refills: 0 | Status: SHIPPED | OUTPATIENT
Start: 2023-08-01 | End: 2023-08-31

## 2023-08-01 NOTE — TELEPHONE ENCOUNTER
Last VISIT 07/06/2023    Last CPE 07/062023    Last REFILL  Medication Quantity Refills Start End   semaglutide (OZEMPIC, 1 MG/DOSE,) 4 MG/3ML Subcutaneous Solution Pen-injector 3 mL 0 6/12/2023 7/12/2023   Sig:   Inject 1 mg into the skin once a week. Last LABS  CBC,HGA1C- 07/27/2023    Per PROTOCOL? Not met- Refill pended   Last HgBA1C < 7.5     Please Approve or Deny.

## 2023-09-05 DIAGNOSIS — Z79.4 CONTROLLED TYPE 2 DIABETES MELLITUS WITH INSULIN THERAPY (HCC): ICD-10-CM

## 2023-09-05 DIAGNOSIS — E11.9 CONTROLLED TYPE 2 DIABETES MELLITUS WITH INSULIN THERAPY (HCC): ICD-10-CM

## 2023-09-07 RX ORDER — LOSARTAN POTASSIUM 25 MG/1
25 TABLET ORAL DAILY
Qty: 90 TABLET | Refills: 0 | Status: SHIPPED | OUTPATIENT
Start: 2023-09-07

## 2023-09-07 RX ORDER — ATORVASTATIN CALCIUM 10 MG/1
10 TABLET, FILM COATED ORAL NIGHTLY
Qty: 90 TABLET | Refills: 0 | Status: SHIPPED | OUTPATIENT
Start: 2023-09-07

## 2023-09-07 RX ORDER — GLIPIZIDE 10 MG/1
10 TABLET, FILM COATED, EXTENDED RELEASE ORAL DAILY
Qty: 90 TABLET | Refills: 0 | Status: SHIPPED | OUTPATIENT
Start: 2023-09-07

## 2023-09-07 RX ORDER — SEMAGLUTIDE 1.34 MG/ML
1 INJECTION, SOLUTION SUBCUTANEOUS WEEKLY
Qty: 3 EACH | Refills: 1 | Status: SHIPPED | OUTPATIENT
Start: 2023-09-07

## 2023-09-07 RX ORDER — INSULIN ASPART 100 [IU]/ML
26 INJECTION, SUSPENSION SUBCUTANEOUS 2 TIMES DAILY
Qty: 50 ML | Refills: 1 | Status: SHIPPED | OUTPATIENT
Start: 2023-09-07

## 2023-09-07 NOTE — TELEPHONE ENCOUNTER
Requested Prescriptions     Pending Prescriptions Disp Refills    OZEMPIC, 1 MG/DOSE, 4 MG/3ML Subcutaneous Solution Pen-injector [Pharmacy Med Name: OZEMPIC 4 MG/3 ML (1 MG/DOSE)]  0     Sig: INJECT 1MG INTO THE SKIN ONCE A WEEK    GLIPIZIDE ER 10 MG Oral Tablet 24 Hr [Pharmacy Med Name: GLIPIZIDE ER 10 MG TABLET] 90 tablet 0     Sig: TAKE 1 TABLET BY MOUTH EVERY DAY    LOSARTAN 25 MG Oral Tab [Pharmacy Med Name: LOSARTAN POTASSIUM 25 MG TAB] 90 tablet 0     Sig: TAKE 1 TABLET BY MOUTH EVERY DAY    ATORVASTATIN 10 MG Oral Tab [Pharmacy Med Name: ATORVASTATIN 10 MG TABLET] 90 tablet 0     Sig: TAKE 1 TABLET BY MOUTH EVERY DAY AT NIGHT    NovoLOG Mix 70/30 FlexPen 100 UNIT/ML Susp Pen-injector (Insulin Aspart Prot & Aspart 70/30) [Pharmacy Med Name: Vane Baseman 70-30 FLEXPEN]  1     Sig: INJECT 26 UNITS INTO THE SKIN 2 (TWO) TIMES DAILY. METFORMIN HCL 1000 MG Oral Tab [Pharmacy Med Name: METFORMIN HCL 1,000 MG TABLET] 180 tablet 0     Sig: TAKE 1 TABLET BY MOUTH TWICE A DAY WITH MEALS       LOV: 7/6/23    LAST PHYSICAL: 7/6/23    LAST REFILL: ozempic- 3mL- 8/1/23  Flexpen- 45mL- 3/21/23  Metformin-180- 1/23/23  Dgsfvpam-21-3/12/23  Onvagwael-54-8/12/23  Wfhdrslnncoe-52-5/9/23    LAST LAB: 7/27/23    Your Appointments      Tuesday September 19, 2023  1:15 PM  US THYROID with  [facility]) [facility]  [phone]   Please arrive 15 minutes prior to your scheduled appointment time.     There are no eating or activity restrictions for this exam.

## 2023-11-13 RX ORDER — SEMAGLUTIDE 1.34 MG/ML
1 INJECTION, SOLUTION SUBCUTANEOUS WEEKLY
Qty: 3 EACH | Refills: 1 | Status: SHIPPED | OUTPATIENT
Start: 2023-11-13

## 2023-12-04 DIAGNOSIS — Z79.4 CONTROLLED TYPE 2 DIABETES MELLITUS WITH INSULIN THERAPY (HCC): ICD-10-CM

## 2023-12-04 DIAGNOSIS — E11.9 CONTROLLED TYPE 2 DIABETES MELLITUS WITH INSULIN THERAPY (HCC): ICD-10-CM

## 2023-12-04 RX ORDER — ATORVASTATIN CALCIUM 10 MG/1
10 TABLET, FILM COATED ORAL NIGHTLY
Qty: 90 TABLET | Refills: 0 | Status: SHIPPED | OUTPATIENT
Start: 2023-12-04

## 2023-12-04 RX ORDER — GLIPIZIDE 10 MG/1
10 TABLET, FILM COATED, EXTENDED RELEASE ORAL DAILY
Qty: 90 TABLET | Refills: 0 | Status: SHIPPED | OUTPATIENT
Start: 2023-12-04

## 2023-12-04 RX ORDER — LOSARTAN POTASSIUM 25 MG/1
25 TABLET ORAL DAILY
Qty: 90 TABLET | Refills: 0 | Status: SHIPPED | OUTPATIENT
Start: 2023-12-04

## 2023-12-04 NOTE — TELEPHONE ENCOUNTER
Last OV? 07/06/2023    Last CPE? 07/06/2023    Last Refill? Medication Quantity Refills Start End   atorvastatin 10 MG Oral Tab 90 tablet 0 9/7/2023    Sig:   Take 1 tablet (10 mg total) by mouth nightly. Last Labs? CBC,CMP, Lipid- 07/27/203      No future appointments. Per Protocol?    Met- Refill sent for 90 days    Please Approve or Deny

## 2024-03-03 DIAGNOSIS — E11.9 CONTROLLED TYPE 2 DIABETES MELLITUS WITH INSULIN THERAPY (HCC): ICD-10-CM

## 2024-03-03 DIAGNOSIS — Z79.4 CONTROLLED TYPE 2 DIABETES MELLITUS WITH INSULIN THERAPY (HCC): ICD-10-CM

## 2024-03-04 RX ORDER — LOSARTAN POTASSIUM 25 MG/1
25 TABLET ORAL DAILY
Qty: 90 TABLET | Refills: 0 | Status: SHIPPED | OUTPATIENT
Start: 2024-03-04

## 2024-03-04 RX ORDER — GLIPIZIDE 10 MG/1
10 TABLET, FILM COATED, EXTENDED RELEASE ORAL DAILY
Qty: 90 TABLET | Refills: 0 | Status: SHIPPED | OUTPATIENT
Start: 2024-03-04

## 2024-03-04 RX ORDER — ATORVASTATIN CALCIUM 10 MG/1
10 TABLET, FILM COATED ORAL NIGHTLY
Qty: 90 TABLET | Refills: 0 | Status: SHIPPED | OUTPATIENT
Start: 2024-03-04

## 2024-04-29 RX ORDER — INSULIN ASPART 100 [IU]/ML
26 INJECTION, SUSPENSION SUBCUTANEOUS 2 TIMES DAILY
Qty: 6 EACH | Refills: 0 | Status: SHIPPED | OUTPATIENT
Start: 2024-04-29 | End: 2024-05-29

## 2024-04-29 NOTE — TELEPHONE ENCOUNTER
Long overdue for follow-up of uncontrolled diabetes. Needs labs and follow-up asap. 30-day refill provided.

## 2024-04-30 ENCOUNTER — PATIENT MESSAGE (OUTPATIENT)
Dept: INTERNAL MEDICINE CLINIC | Facility: CLINIC | Age: 49
End: 2024-04-30

## 2024-04-30 DIAGNOSIS — Z79.4 TYPE 2 DIABETES MELLITUS WITHOUT COMPLICATION, WITH LONG-TERM CURRENT USE OF INSULIN (HCC): Primary | ICD-10-CM

## 2024-04-30 DIAGNOSIS — E11.9 TYPE 2 DIABETES MELLITUS WITHOUT COMPLICATION, WITH LONG-TERM CURRENT USE OF INSULIN (HCC): Primary | ICD-10-CM

## 2024-06-03 ENCOUNTER — TELEPHONE (OUTPATIENT)
Dept: INTERNAL MEDICINE CLINIC | Facility: CLINIC | Age: 49
End: 2024-06-03

## 2024-06-04 ENCOUNTER — TELEPHONE (OUTPATIENT)
Dept: INTERNAL MEDICINE CLINIC | Facility: CLINIC | Age: 49
End: 2024-06-04

## 2024-06-04 DIAGNOSIS — Z79.4 CONTROLLED TYPE 2 DIABETES MELLITUS WITH INSULIN THERAPY (HCC): ICD-10-CM

## 2024-06-04 DIAGNOSIS — E11.9 CONTROLLED TYPE 2 DIABETES MELLITUS WITH INSULIN THERAPY (HCC): ICD-10-CM

## 2024-06-05 RX ORDER — ATORVASTATIN CALCIUM 10 MG/1
10 TABLET, FILM COATED ORAL NIGHTLY
Qty: 30 TABLET | Refills: 1 | Status: SHIPPED | OUTPATIENT
Start: 2024-06-05

## 2024-06-06 NOTE — TELEPHONE ENCOUNTER
Please call patient to assist in making an appointment.  Thank you    ([employer] message sent as well)

## 2024-06-06 NOTE — TELEPHONE ENCOUNTER
Refill passed per protocol.    Requested Prescriptions   Pending Prescriptions Disp Refills    ATORVASTATIN 10 MG Oral Tab [Pharmacy Med Name: ATORVASTATIN 10 MG TABLET] 90 tablet 0     Sig: TAKE 1 TABLET BY MOUTH EVERY DAY AT NIGHT       Cholesterol Medication Protocol Passed - 6/3/2024 12:44 AM        Passed - ALT < 80     Lab Results   Component Value Date    ALT 53 07/27/2023             Passed - ALT resulted within past year        Passed - Lipid panel within past 12 months     Lab Results   Component Value Date    CHOLEST 86 07/27/2023    TRIG 72 07/27/2023    HDL 40 07/27/2023    LDL 30 07/27/2023    VLDL 10 07/27/2023    TCHDLRATIO 3.64 05/26/2017    NONHDLC 46 07/27/2023             Passed - In person appointment or virtual visit in the past 12 mos or appointment in next 3 mos     Recent Outpatient Visits              11 months ago Routine general medical examination at a health care facility    [address]    Office Visit    1 year ago Type 2 diabetes mellitus without complication, with long-term current use of insulin ([facility])    [address]    Office Visit    2 years ago Routine general medical examination at a health care facility    [address]    Office Visit    3 years ago Controlled type 2 diabetes mellitus with insulin therapy ([facility])    [address]    Office Visit    4 years ago Therapeutic drug monitoring    [facility] - [facility], [place]    Office Visit                             Recent Outpatient Visits              11 months ago Routine general medical examination at a health care facility    [address]    Office Visit    1 year ago Type 2 diabetes mellitus without  complication, with long-term current use of insulin (HCC)    [facility], [address] [place], [place] [address]Rosy Amish, MD    Office Visit    2 years ago Routine general medical examination at a health care facility    [facility] [address] [place], [place] [address]Rosy Amish, MD    Office Visit    3 years ago Controlled type 2 diabetes mellitus with insulin therapy (HCC)    [facility] [address] [place], [place] [address]Rosy Amish, MD    Office Visit    4 years ago Therapeutic drug monitoring    Internal Medicine - [facility], [place] Macho Paris MD    Office Visit

## 2024-06-07 RX ORDER — LOSARTAN POTASSIUM 25 MG/1
25 TABLET ORAL DAILY
Qty: 30 TABLET | Refills: 0 | Status: SHIPPED | OUTPATIENT
Start: 2024-06-07

## 2024-06-07 NOTE — TELEPHONE ENCOUNTER
Refill passed per [facility] protocol.  Requested Prescriptions   Pending Prescriptions Disp Refills    GLIPIZIDE ER 10 MG Oral Tablet 24 Hr [Pharmacy Med Name: GLIPIZIDE ER 10 MG TABLET] 90 tablet 0     Sig: TAKE 1 TABLET BY MOUTH EVERY DAY       Diabetes Medication Protocol Failed - 6/4/2024 11:02 AM        Failed - Last A1C < 7.5 and within past 6 months     Lab Results   Component Value Date    A1C 8.2 (H) 07/27/2023             Failed - In person appointment or virtual visit in the past 6 mos or appointment in next 3 mos     Recent Outpatient Visits              11 months ago Routine general medical examination at a health care facility    [address]    Office Visit    1 year ago Type 2 diabetes mellitus without complication, with long-term current use of insulin ([facility])    [address]    Office Visit    2 years ago Routine general medical examination at a health care facility    [address]    Office Visit    3 years ago Controlled type 2 diabetes mellitus with insulin therapy ([facility])    [address]    Office Visit    4 years ago Therapeutic drug monitoring    Internal Medicine - Samuel Ordonez, [place] Macho Paris MD    Office Visit                      Passed - Microalbumin procedure in past 12 months or taking ACE/ARB        Passed - EGFRCR or GFRNAA > 50     GFR Evaluation  EGFRCR: 115 , resulted on 7/27/2023          Passed - GFR in the past 12 months          LOSARTAN 25 MG Oral Tab [Pharmacy Med Name: LOSARTAN POTASSIUM 25 MG TAB] 90 tablet 0     Sig: Take 1 tablet (25 mg total) by mouth daily.       Hypertension Medications Protocol Passed - 6/4/2024 11:02 AM        Passed - CMP or BMP in past 12 months        Passed - Last BP reading less than 140/90     BP  Readings from Last 1 Encounters:   07/06/23 106/70               Passed - In person appointment or virtual visit in the past 12 mos or appointment in next 3 mos     Recent Outpatient Visits              11 months ago Routine general medical examination at a health care facility    [facility] Rosy Cifuentes Amish, MD    Office Visit    1 year ago Type 2 diabetes mellitus without complication, with long-term current use of insulin ([facility])    [facility] [address] [place], [place] [address]Rosy Amish, MD    Office Visit    2 years ago Routine general medical examination at a health care facility    [facility] Rosy Cifuentes Amish, MD    Office Visit    3 years ago Controlled type 2 diabetes mellitus with insulin therapy ([facility])    [facility] [address] [place], [place] [address]Rosy Amish, MD    Office Visit    4 years ago Therapeutic drug monitoring    Internal Medicine - Samuel Ordonez, [place] Macho Paris MD    Office Visit                      Passed - EGFRCR or GFRNAA > 50     GFR Evaluation  EGFRCR: 115 , resulted on 7/27/2023            METFORMIN HCL 1000 MG Oral Tab [Pharmacy Med Name: METFORMIN HCL 1,000 MG TABLET] 180 tablet 0     Sig: TAKE 1 TABLET BY MOUTH TWICE A DAY WITH MEALS       Diabetes Medication Protocol Failed - 6/4/2024 11:02 AM        Failed - Last A1C < 7.5 and within past 6 months     Lab Results   Component Value Date    A1C 8.2 (H) 07/27/2023             Failed - In person appointment or virtual visit in the past 6 mos or appointment in next 3 mos     Recent Outpatient Visits              11 months ago Routine general medical examination at a health care facility    [facility] Rosy Cifuentes Amish, MD    Office Visit    1 year ago Type 2 diabetes mellitus without complication, with long-term current use of insulin ([facility])    [facility] [address] [place] [address]  Obie Jansen MD    Office Visit    2 years ago Routine general medical examination at a health care facility    [address] Obie Jansen MD    Office Visit    3 years ago Controlled type 2 diabetes mellitus with insulin therapy (HCC)    [address] Obie Jansen MD    Office Visit    4 years ago Therapeutic drug monitoring    Internal Medicine - [address], [place] Macho Paris MD    Office Visit                      Passed - Microalbumin procedure in past 12 months or taking ACE/ARB        Passed - EGFRCR or GFRNAA > 50     GFR Evaluation  EGFRCR: 115 , resulted on 7/27/2023          Passed - GFR in the past 12 months          OZEMPIC, 1 MG/DOSE, 4 MG/3ML Subcutaneous Solution Pen-injector [Pharmacy Med Name: OZEMPIC 4 MG/3 ML (1 MG/DOSE)]  1     Sig: INJECT 1MG INTO THE SKIN ONCE A WEEK       Diabetes Medication Protocol Failed - 6/4/2024 11:02 AM        Failed - Last A1C < 7.5 and within past 6 months     Lab Results   Component Value Date    A1C 8.2 (H) 07/27/2023             Failed - In person appointment or virtual visit in the past 6 mos or appointment in next 3 mos     Recent Outpatient Visits              11 months ago Routine general medical examination at a health care facility    [address] Obie Jansen MD    Office Visit    1 year ago Type 2 diabetes mellitus without complication, with long-term current use of insulin (HCC)    [address] Obie Jansen MD    Office Visit    2 years ago Routine general medical examination at a health care facility    [facility] [address] [place], [place] [address]Rosy Amish, MD    Office Visit    3 years ago Controlled type 2 diabetes mellitus with insulin therapy (HCC)    [address]    Office Visit    4 years  ago Therapeutic drug monitoring    Internal Medicine - Samuel Ordonez, Macho Hogue MD    Office Visit                      Passed - Microalbumin procedure in past 12 months or taking ACE/ARB        Passed - EGFRCR or GFRNAA > 50     GFR Evaluation  EGFRCR: 115 , resulted on 7/27/2023          Passed - GFR in the past 12 months           Recent Outpatient Visits              11 months ago Routine general medical examination at a health care facility    [facility] [address] [place], [place] [address]Rosy Amish, MD    Office Visit    1 year ago Type 2 diabetes mellitus without complication, with long-term current use of insulin ([facility])    [facility], [address] [place], [place] [address]Rosy Amish, MD    Office Visit    2 years ago Routine general medical examination at a health care facility    [facility] [address] [place], [place] [address]Rosy Amish, MD    Office Visit    3 years ago Controlled type 2 diabetes mellitus with insulin therapy ([facility])    [facility] [address] [place], [place] [address]Rosy Amish, MD    Office Visit    4 years ago Therapeutic drug monitoring    Internal Medicine - Samuel Ordonez, Macho Hogue MD    Office Visit

## 2024-06-07 NOTE — TELEPHONE ENCOUNTER
Please review; protocol failed/ has no protocol      Message sent for patient to make an appointment.     No active /future labs noted     Requested Prescriptions   Pending Prescriptions Disp Refills    glipiZIDE ER 10 MG Oral Tablet 24 Hr [Pharmacy Med Name: GLIPIZIDE ER 10 MG TABLET] 90 tablet 0     Sig: Take 1 tablet (10 mg total) by mouth daily.       Diabetes Medication Protocol Failed - 6/4/2024 11:02 AM        Failed - Last A1C < 7.5 and within past 6 months     Lab Results   Component Value Date    A1C 8.2 (H) 07/27/2023             Failed - In person appointment or virtual visit in the past 6 mos or appointment in next 3 mos     Recent Outpatient Visits              11 months ago Routine general medical examination at a health care facility    [facility] [address] [place], [place] [address]Rosy Amish, MD    Office Visit    1 year ago Type 2 diabetes mellitus without complication, with long-term current use of insulin ([facility])    [facility] [address] [place], [place] [address]Rosy Amish, MD    Office Visit    2 years ago Routine general medical examination at a health care facility    [facility] [address] [place], [place] [address]Rosy Amish, MD    Office Visit    3 years ago Controlled type 2 diabetes mellitus with insulin therapy ([facility])    [facility] [address] [place], [place] [address]Rosy Amish, MD    Office Visit    4 years ago Therapeutic drug monitoring    Internal Medicine - Samuel Ordonez, [place] Macho Paris MD    Office Visit                      Passed - Microalbumin procedure in past 12 months or taking ACE/ARB        Passed - EGFRCR or GFRNAA > 50     GFR Evaluation  EGFRCR: 115 , resulted on 7/27/2023          Passed - GFR in the past 12 months          metFORMIN HCl 1000 MG Oral Tab [Pharmacy Med Name: METFORMIN HCL 1,000 MG TABLET] 180 tablet 0     Sig: Take 1 tablet (1,000 mg total) by mouth 2 (two) times daily with meals.       Diabetes Medication  Protocol Failed - 6/4/2024 11:02 AM        Failed - Last A1C < 7.5 and within past 6 months     Lab Results   Component Value Date    A1C 8.2 (H) 07/27/2023             Failed - In person appointment or virtual visit in the past 6 mos or appointment in next 3 mos     Recent Outpatient Visits              11 months ago Routine general medical examination at a health care facility    [facility], [address] [place] [address], Obie Jansen MD    Office Visit    1 year ago Type 2 diabetes mellitus without complication, with long-term current use of insulin ([facility])    [address]    Office Visit    2 years ago Routine general medical examination at a health care facility    [facility], [address] [place], [place] [address]Rosy Amish, MD    Office Visit    3 years ago Controlled type 2 diabetes mellitus with insulin therapy ([facility])    [address]    Office Visit    4 years ago Therapeutic drug monitoring    Internal Medicine - Samuel Ordonez, [place] Macho Paris MD    Office Visit                      Passed - Microalbumin procedure in past 12 months or taking ACE/ARB        Passed - EGFRCR or GFRNAA > 50     GFR Evaluation  EGFRCR: 115 , resulted on 7/27/2023          Passed - GFR in the past 12 months          semaglutide (OZEMPIC, 1 MG/DOSE,) 4 MG/3ML Subcutaneous Solution Pen-injector [Pharmacy Med Name: OZEMPIC 4 MG/3 ML (1 MG/DOSE)]  1     Sig: Inject 1 mg into the skin.       Diabetes Medication Protocol Failed - 6/4/2024 11:02 AM        Failed - Last A1C < 7.5 and within past 6 months     Lab Results   Component Value Date    A1C 8.2 (H) 07/27/2023             Failed - In person appointment or virtual visit in the past 6 mos or appointment in next 3 mos     Recent Outpatient Visits              11 months ago Routine general medical examination at a health care facility     [facility] [address] [place], [place] [address]Rosy Amish, MD    Office Visit    1 year ago Type 2 diabetes mellitus without complication, with long-term current use of insulin ([facility])    [facility] [address] [place], [place] [address]Rosy Amish, MD    Office Visit    2 years ago Routine general medical examination at a health care facility    [facility] [address] [place], [place] [address]Rosy Amish, MD    Office Visit    3 years ago Controlled type 2 diabetes mellitus with insulin therapy ([facility])    [facility] [address] [place], [place] [address]Rosy Amish, MD    Office Visit    4 years ago Therapeutic drug monitoring    Internal Medicine - [place] Ordonez, [place] Macho Paris MD    Office Visit                      Passed - Microalbumin procedure in past 12 months or taking ACE/ARB        Passed - EGFRCR or GFRNAA > 50     GFR Evaluation  EGFRCR: 115 , resulted on 7/27/2023          Passed - GFR in the past 12 months         Signed Prescriptions Disp Refills    losartan 25 MG Oral Tab 30 tablet 0     Sig: Take 1 tablet (25 mg total) by mouth daily.       Hypertension Medications Protocol Passed - 6/4/2024 11:02 AM        Passed - CMP or BMP in past 12 months        Passed - Last BP reading less than 140/90     BP Readings from Last 1 Encounters:   07/06/23 106/70               Passed - In person appointment or virtual visit in the past 12 mos or appointment in next 3 mos     Recent Outpatient Visits              11 months ago Routine general medical examination at a health care facility    [facility] [address] [place], [place] [address]Rosy Amish, MD    Office Visit    1 year ago Type 2 diabetes mellitus without complication, with long-term current use of insulin ([facility])    [facility] [address] [place], [place] [address]Rosy Amish, MD    Office Visit    2 years ago Routine general medical examination at a health care facility    [facility]  [address] [place] Doshi, Amish, MD    Office Visit    3 years ago Controlled type 2 diabetes mellitus with insulin therapy ([facility])    [facility] [place] [facility]    Office Visit    4 years ago Therapeutic drug monitoring    Internal Medicine - Samuel Ordonez, Macho Hogue MD    Office Visit                      Passed - EGFRCR or GFRNAA > 50     GFR Evaluation  EGFRCR: 115 , resulted on 7/27/2023             Recent Outpatient Visits              11 months ago Routine general medical examination at a health care facility    [facility] [place] [facility]    Office Visit    1 year ago Type 2 diabetes mellitus without complication, with long-term current use of insulin ([facility])    [facility] [place] [facility]    Office Visit    2 years ago Routine general medical examination at a health care facility    [facility] [place] [facility]    Office Visit    3 years ago Controlled type 2 diabetes mellitus with insulin therapy ([facility])    [facility] [place] [facility]    Office Visit    4 years ago Therapeutic drug monitoring    Internal Medicine - Samuel Ordonez, [place] Hogue MD    Office Visit

## 2024-06-08 NOTE — TELEPHONE ENCOUNTER
Long overdue for f/u with multiple care gaps.     No future appointments.    Hold for pcp approval/denial.

## 2024-06-09 RX ORDER — SEMAGLUTIDE 1.34 MG/ML
1 INJECTION, SOLUTION SUBCUTANEOUS WEEKLY
Qty: 3 ML | Refills: 0 | Status: SHIPPED | OUTPATIENT
Start: 2024-06-09

## 2024-06-09 RX ORDER — GLIPIZIDE 10 MG/1
10 TABLET, FILM COATED, EXTENDED RELEASE ORAL DAILY
Qty: 30 TABLET | Refills: 0 | Status: SHIPPED | OUTPATIENT
Start: 2024-06-09

## 2024-06-29 NOTE — TELEPHONE ENCOUNTER
Bogdan Solares requesting Medication Refill for:    Medication name and dose (copy and paste from medication list):   Medication Quantity Refills Start End   losartan 25 MG Oral Tab 30 tablet 0 6/7/2024 --   Sig:   Take 1 tablet (25 mg total) by mouth daily.     Route:   Oral     Note to Pharmacy:   appointment needed for further refills.     Order #:   [id number]         If medication is not on medication list - transfer patient to RN queue for triage    Preferred Pharmacy:   [place] [id number] [place] [address] [phone], [phone]   [address] [place] [address]   Phone: [phone] Fax: [phone]   Hours: Not open 24 hours       LOV: 7/6/2023   Last Refill date: 6/7/24  Next Scheduled appointment: 7/25/2024    Bogdan Solares requesting Medication Refill for:    Medication name and dose (copy and paste from medication list):   Medication Quantity Refills Start End   atorvastatin 10 MG Oral Tab 30 tablet 1 6/5/2024 --   Sig:   Take 1 tablet (10 mg total) by mouth nightly.     Route:   Oral     Order #:   [id number]         If medication is not on medication list - transfer patient to RN queue for triage    Preferred Pharmacy:   [place] [address] [place] [address] [phone], [phone]   [address] [place] [address]   Phone: [phone] Fax: [phone]   Hours: Not open 24 hours       LOV: 7/6/2023   Last Refill date: 6/5/24  Next Scheduled appointment: 7/25/2024

## 2024-06-30 RX ORDER — LOSARTAN POTASSIUM 25 MG/1
25 TABLET ORAL DAILY
Qty: 90 TABLET | Refills: 3 | Status: SHIPPED | OUTPATIENT
Start: 2024-06-30

## 2024-06-30 RX ORDER — ATORVASTATIN CALCIUM 10 MG/1
10 TABLET, FILM COATED ORAL NIGHTLY
Qty: 90 TABLET | Refills: 3 | Status: SHIPPED | OUTPATIENT
Start: 2024-06-30

## 2024-06-30 NOTE — TELEPHONE ENCOUNTER
Refill passed per [facility] protocol.  Requested Prescriptions   Pending Prescriptions Disp Refills    losartan 25 MG Oral Tab 30 tablet 0     Sig: Take 1 tablet (25 mg total) by mouth daily.       Hypertension Medications Protocol Passed - 6/30/2024  6:03 PM        Passed - CMP or BMP in past 12 months        Passed - Last BP reading less than 140/90     BP Readings from Last 1 Encounters:   07/06/23 106/70               Passed - In person appointment or virtual visit in the past 12 mos or appointment in next 3 mos     Recent Outpatient Visits              12 months ago Routine general medical examination at a health care facility    [address] Obie Booth MD    Office Visit    1 year ago Type 2 diabetes mellitus without complication, with long-term current use of insulin ([facility])    [address]    Office Visit    2 years ago Routine general medical examination at a health care facility    [address]    Office Visit    3 years ago Controlled type 2 diabetes mellitus with insulin therapy ([facility])    [address] Obie Jansen MD    Office Visit    4 years ago Therapeutic drug monitoring    Internal Medicine - [place] José Luis, [place] Macho Paris MD    Office Visit          Future Appointments         Provider Department Appt Notes    In 3 weeks Obie Booth MD [address] Annual exam as well-last 7/6/23                    Passed - EGFRCR or GFRNAA > 50     GFR Evaluation  EGFRCR: 115 , resulted on 7/27/2023            atorvastatin 10 MG Oral Tab 30 tablet 1     Sig: Take 1 tablet (10 mg total) by mouth nightly.       Cholesterol Medication Protocol Passed - 6/30/2024  6:03 PM        Passed - ALT < 80     Lab Results   Component Value Date    ALT 53 07/27/2023              Passed - ALT resulted within past year        Passed - Lipid panel within past 12 months     Lab Results   Component Value Date    CHOLEST 86 07/27/2023    TRIG 72 07/27/2023    HDL 40 07/27/2023    LDL 30 07/27/2023    VLDL 10 07/27/2023    TCHDLRATIO 3.64 05/26/2017    NONHDLC 46 07/27/2023             Passed - In person appointment or virtual visit in the past 12 mos or appointment in next 3 mos     Recent Outpatient Visits              12 months ago Routine general medical examination at a health care facility    [facility] [address] [place], [place] [address]Rosy Amish, MD    Office Visit    1 year ago Type 2 diabetes mellitus without complication, with long-term current use of insulin ([facility])    [facility] [address] [place], [place] [address]Rosy Amish, MD    Office Visit    2 years ago Routine general medical examination at a [facility] [address] [place], [place] [address]Rosy Amish, MD    Office Visit    3 years ago Controlled type 2 diabetes mellitus with insulin therapy ([facility])    [facility] [address] [place], [place] [address]Rosy Amish, MD    Office Visit    4 years ago Therapeutic drug monitoring    Internal Medicine - [place] José Luis, [place] Macho Paris MD    Office Visit          Future Appointments         Provider Department Appt Notes    In 3 weeks Obie Booth MD [address] Annual exam as well-last 7/6/23                       Recent Outpatient Visits              12 months ago Routine general medical examination at a [facility] facility    [facility] [address] [place] [address] [place]    Office Visit    1 year ago Type 2 diabetes mellitus without complication, with long-term current use of insulin ([facility])    [facility] [address] [place], [place] [address]Obie Cárdenas MD    Office Visit    2 years ago Routine general medical examination at a [facility]  care facility    [facility], [address] [place] [address] Obie Booth MD    Office Visit    3 years ago Controlled type 2 diabetes mellitus with insulin therapy (HCC)    [facility], [address] [place] [address] Obie Booth MD    Office Visit    4 years ago Therapeutic drug monitoring    Internal Medicine - [place] José Luis, [place] Macho Paris MD    Office Visit          Future Appointments         Provider Department Appt Notes    In 3 weeks Obie Booth MD [address] Annual exam as well-last 7/6/23

## 2024-07-02 DIAGNOSIS — Z79.4 CONTROLLED TYPE 2 DIABETES MELLITUS WITH INSULIN THERAPY (HCC): ICD-10-CM

## 2024-07-02 DIAGNOSIS — E11.9 CONTROLLED TYPE 2 DIABETES MELLITUS WITH INSULIN THERAPY (HCC): ICD-10-CM

## 2024-07-02 NOTE — TELEPHONE ENCOUNTER
Bogdan Solares requesting Medication Refill for:    Medication name and dose (copy and paste from medication list):   glipiZIDE ER 10 MG Oral Tablet 24 Hr #90 30 tablet 0 6/9/2024 --    Sig - Route: Take 1 tablet (10 mg total) by mouth daily. - Oral        If medication is not on medication list - transfer patient to RN queue for triage    Preferred Pharmacy: [facility]    LOV: 7/6/2023   Last Refill date: 6/9/24  Next Scheduled appointment: 7/25/2024       metFORMIN HCl 1000 MG Oral Tab  #90 60 tablet 0 6/9/2024 --   Sig:   Take 1 tablet (1,000 mg total) by mouth 2 (two) times daily with meals.     Route:   Oral       Last filled 6/9/24-[facility]

## 2024-07-05 NOTE — TELEPHONE ENCOUNTER
Please review; protocol failed/ has no protocol      Please see message below for upcoming appointment.    Future Appointments   Date Time Provider Department Center   7/25/2024  3:40 PM Obie Booth MD [facility]       Requested Prescriptions   Pending Prescriptions Disp Refills    glipiZIDE ER 10 MG Oral Tablet 24 Hr 30 tablet 0     Sig: Take 1 tablet (10 mg total) by mouth daily.       Diabetes Medication Protocol Failed - 7/2/2024  9:10 AM        Failed - Last A1C < 7.5 and within past 6 months     Lab Results   Component Value Date    A1C 8.2 (H) 07/27/2023             Passed - In person appointment or virtual visit in the past 6 mos or appointment in next 3 mos     Recent Outpatient Visits              1 year ago Routine general medical examination at a health care facility    [address] Obie Booth MD    Office Visit    1 year ago Type 2 diabetes mellitus without complication, with long-term current use of insulin ([facility])    [address] Obie Booth MD    Office Visit    2 years ago Routine general medical examination at a health care facility    [address] [place] Obie Booth MD    Office Visit    3 years ago Controlled type 2 diabetes mellitus with insulin therapy ([facility])    [address] [place] Obie Booth MD    Office Visit    4 years ago Therapeutic drug monitoring    Internal Medicine - Samuel Ordonez, [place] Macho Paris MD    Office Visit          Future Appointments         Provider Department Appt Notes    In 2 weeks Obie Booth MD [address] Annual exam as well-last 7/6/23                    Passed - Microalbumin procedure in past 12 months or taking ACE/ARB        Passed - EGFRCR or GFRNAA > 50     GFR Evaluation  EGFRCR: 115 , resulted on 7/27/2023          Passed - GFR in the  past 12 months          metFORMIN HCl 1000 MG Oral Tab 60 tablet 0     Sig: Take 1 tablet (1,000 mg total) by mouth 2 (two) times daily with meals.       Diabetes Medication Protocol Failed - 7/2/2024  9:10 AM        Failed - Last A1C < 7.5 and within past 6 months     Lab Results   Component Value Date    A1C 8.2 (H) 07/27/2023             Passed - In person appointment or virtual visit in the past 6 mos or appointment in next 3 mos     Recent Outpatient Visits              1 year ago Routine general medical examination at a health care facility    [facility] [address] [place] [address] Obie Jansen MD    Office Visit    1 year ago Type 2 diabetes mellitus without complication, with long-term current use of insulin ([facility])    [facility] [address] [place], [place] [address]Rosy Amish, MD    Office Visit    2 years ago Routine general medical examination at a health care [facility] [address] [place], [place] [address]Rosy Amish, MD    Office Visit    3 years ago Controlled type 2 diabetes mellitus with insulin therapy ([facility])    [facility] [address] [place], [place] [address]Rosy Amish, MD    Office Visit    4 years ago Therapeutic drug monitoring    Internal Medicine - [place], [place] Macho Paris MD    Office Visit          Future Appointments         Provider Department Appt Notes    In 2 weeks Obie Booth MD [address] Annual exam as well-last 7/6/23                    Passed - Microalbumin procedure in past 12 months or taking ACE/ARB        Passed - EGFRCR or GFRNAA > 50     GFR Evaluation  EGFRCR: 115 , resulted on 7/27/2023          Passed - GFR in the past 12 months           Recent Outpatient Visits              1 year ago Routine general medical examination at a health care facility    [address] Obie Jansen MD    Office Visit    1 year ago Type 2  diabetes mellitus without complication, with long-term current use of insulin (HCC)    [address] Obie Booth MD    Office Visit    2 years ago Routine general medical examination at a health care facility    [facility] [address] [place] [address] Obie Jansen MD    Office Visit    3 years ago Controlled type 2 diabetes mellitus with insulin therapy (HCC)    [address] Obie Booth MD    Office Visit    4 years ago Therapeutic drug monitoring    Internal Medicine - Samuel Ordonez, [place] Macho Paris MD    Office Visit          Future Appointments         Provider Department Appt Notes    In 2 weeks Obie Booth MD [address] Annual exam as well-last 7/6/23

## 2024-07-06 RX ORDER — GLIPIZIDE 10 MG/1
10 TABLET, FILM COATED, EXTENDED RELEASE ORAL DAILY
Qty: 90 TABLET | Refills: 0 | Status: SHIPPED | OUTPATIENT
Start: 2024-07-06

## 2024-07-25 ENCOUNTER — OFFICE VISIT (OUTPATIENT)
Dept: INTERNAL MEDICINE CLINIC | Facility: CLINIC | Age: 49
End: 2024-07-25
Payer: COMMERCIAL

## 2024-07-25 VITALS
SYSTOLIC BLOOD PRESSURE: 126 MMHG | DIASTOLIC BLOOD PRESSURE: 70 MMHG | HEART RATE: 94 BPM | OXYGEN SATURATION: 98 % | BODY MASS INDEX: 24.04 KG/M2 | RESPIRATION RATE: 16 BRPM | HEIGHT: 63.39 IN | WEIGHT: 137.38 LBS | TEMPERATURE: 99 F

## 2024-07-25 DIAGNOSIS — Z12.4 SCREENING FOR CERVICAL CANCER: ICD-10-CM

## 2024-07-25 DIAGNOSIS — E78.00 PURE HYPERCHOLESTEROLEMIA: ICD-10-CM

## 2024-07-25 DIAGNOSIS — Z12.11 SCREEN FOR COLON CANCER: ICD-10-CM

## 2024-07-25 DIAGNOSIS — Z79.4 TYPE 2 DIABETES MELLITUS WITHOUT COMPLICATION, WITH LONG-TERM CURRENT USE OF INSULIN (HCC): ICD-10-CM

## 2024-07-25 DIAGNOSIS — E11.9 TYPE 2 DIABETES MELLITUS WITHOUT COMPLICATION, WITH LONG-TERM CURRENT USE OF INSULIN (HCC): ICD-10-CM

## 2024-07-25 DIAGNOSIS — E04.1 RIGHT THYROID NODULE: ICD-10-CM

## 2024-07-25 DIAGNOSIS — D25.9 UTERINE LEIOMYOMA, UNSPECIFIED LOCATION: ICD-10-CM

## 2024-07-25 DIAGNOSIS — Z12.31 ENCOUNTER FOR SCREENING MAMMOGRAM FOR MALIGNANT NEOPLASM OF BREAST: ICD-10-CM

## 2024-07-25 DIAGNOSIS — Z00.00 ROUTINE GENERAL MEDICAL EXAMINATION AT A HEALTH CARE FACILITY: Primary | ICD-10-CM

## 2024-07-25 PROCEDURE — 90715 TDAP VACCINE 7 YRS/> IM: CPT | Performed by: INTERNAL MEDICINE

## 2024-07-25 PROCEDURE — 3078F DIAST BP <80 MM HG: CPT | Performed by: INTERNAL MEDICINE

## 2024-07-25 PROCEDURE — 3074F SYST BP LT 130 MM HG: CPT | Performed by: INTERNAL MEDICINE

## 2024-07-25 PROCEDURE — 90471 IMMUNIZATION ADMIN: CPT | Performed by: INTERNAL MEDICINE

## 2024-07-25 PROCEDURE — 99396 PREV VISIT EST AGE 40-64: CPT | Performed by: INTERNAL MEDICINE

## 2024-07-25 PROCEDURE — 3008F BODY MASS INDEX DOCD: CPT | Performed by: INTERNAL MEDICINE

## 2024-07-25 RX ORDER — BLOOD-GLUCOSE SENSOR
1 EACH MISCELLANEOUS
Qty: 6 EACH | Refills: 1 | Status: SHIPPED
Start: 2024-07-25 | End: 2024-07-29

## 2024-07-25 RX ORDER — BLOOD-GLUCOSE SENSOR
1 EACH MISCELLANEOUS
Qty: 6 EACH | Refills: 1 | Status: SHIPPED | OUTPATIENT
Start: 2024-07-25 | End: 2024-07-29

## 2024-07-25 RX ORDER — BLOOD-GLUCOSE,RECEIVER,CONT
1 EACH MISCELLANEOUS ONCE
Qty: 1 EACH | Refills: 0 | Status: SHIPPED | OUTPATIENT
Start: 2024-07-25 | End: 2024-07-25

## 2024-07-25 NOTE — PROGRESS NOTES
Bogdan Solares  5/28/1975    Chief Complaint   Patient presents with    Physical     ES rm - 1 -  Physical       HPI:   Bogdan Solares is a 49 year old female who presents for an annual physical examination.    Since last evaluation the patient has maintained her usual state of health.  She has undergone some admittedly unfavorable dietary and lifestyle changes, however fasting blood glucose level today was 120.  No recent laboratory evaluation.  No acute concerns at this time.    Current Outpatient Medications   Medication Sig Dispense Refill    Continuous Glucose Sensor (FREESTYLE MARVIN 3 SENSOR) Does not apply Misc 1 each every 14 (fourteen) days. 6 each 1    Continuous Glucose Sensor (FREESTYLE MARVIN 3 SENSOR) Does not apply Misc 1 each every 14 (fourteen) days. 6 each 1    Continuous Glucose  (FREESTYLE MARVIN 3 READER) Does not apply Misc 1 each one time for 1 dose. 1 each 0    glipiZIDE ER 10 MG Oral Tablet 24 Hr Take 1 tablet (10 mg total) by mouth daily. 90 tablet 0    metFORMIN HCl 1000 MG Oral Tab Take 1 tablet (1,000 mg total) by mouth 2 (two) times daily with meals. 180 tablet 0    losartan 25 MG Oral Tab Take 1 tablet (25 mg total) by mouth daily. 90 tablet 3    atorvastatin 10 MG Oral Tab Take 1 tablet (10 mg total) by mouth nightly. 90 tablet 3    semaglutide (OZEMPIC, 1 MG/DOSE,) 4 MG/3ML Subcutaneous Solution Pen-injector Inject 1 mg into the skin once a week. 3 mL 0    Ascorbic Acid (VITAMIN C) 1000 MG Oral Tab Take 1 tablet (1,000 mg total) by mouth daily.      triamcinolone 0.1 % External Cream Apply topically 2 (two) times daily as needed. 60 g 3    Omega-3 1000 MG Oral Cap daily.      Cyanocobalamin (B-12) 500 MCG Oral Tab Take by mouth.      Cholecalciferol (VITAMIN D) 1000 UNITS Oral Tab Take 1 tablet by mouth daily.        Allergies   Allergen Reactions    Ace Inhibitors       Past Medical History:    Dyslipidemia    Fibrocystic breast changes    Long QT syndrome    Microalbuminuria     Type II or unspecified type diabetes mellitus without mention of complication, not stated as uncontrolled    Unspecified sinusitis (chronic)      Patient Active Problem List   Diagnosis    Iron deficiency anemia    Fibroid uterus    Right thyroid nodule    Pure hypercholesterolemia    Eczema    Type 2 diabetes mellitus without complication, with long-term current use of insulin (HCC)      Past Surgical History:   Procedure Laterality Date    Daphne biopsy stereo nodule 1 site right (cpt=19081) Right 05/2019    benign      Family History   Problem Relation Age of Onset    Diabetes Father         DM    Hypertension Father     Other (CAD) Father     Diabetes Mother         DM    Hypertension Mother       Social History     Socioeconomic History    Marital status:    Tobacco Use    Smoking status: Never    Smokeless tobacco: Never   Vaping Use    Vaping status: Never Used   Substance and Sexual Activity    Alcohol use: No     Alcohol/week: 0.0 standard drinks of alcohol    Drug use: No    Sexual activity: Yes     Partners: Male     Birth control/protection: Condom   Other Topics Concern    Caffeine Concern Yes     Comment: 1 cup of coffee x day    Exercise No    Seat Belt Yes         REVIEW OF SYSTEMS:   GENERAL: feels well otherwise  SKIN: no rashes  EYES:denies blurred vision or double vision  HEENT: not congested  LUNGS: denies shortness of breath with exertion  CARDIOVASCULAR: denies chest pain on exertion  GI: no nausea or abdominal pain  NEURO: denies headaches    EXAM:   /70 (BP Location: Left arm, Patient Position: Sitting, Cuff Size: adult)   Pulse 94   Temp 98.6 °F (37 °C) (Temporal)   Resp 16   Ht 5' 3.39\" (1.61 m)   Wt 137 lb 6.4 oz (62.3 kg)   SpO2 98%   BMI 24.04 kg/m²   GENERAL: Well developed, well nourished,in no apparent distress  SKIN: No rashes,no suspicious lesions  EYES: bilateral conjunctiva are clear  HEENT: atraumatic, normocephalic. TM WNL BL.  NECK: supple,no adenopathy,no  bruits  LUNGS: clear to auscultation  CARDIO: RRR without murmur  GI: good BS's,no masses, HSM or tenderness  Bilateral barefoot skin diabetic exam is normal, visualized feet and the appearance is normal.  Bilateral monofilament/sensation of both feet is normal.  Pulsation pedal pulse exam of both lower legs/feet is normal as well.  ASSESSMENT AND PLAN:   Bogdan Solares is a 49 year old female who presents for an annual physical examination.    Outstanding screening and preventive measures:   Screening for cervical cancer: Referred to gynecology service  Screening for breast cancer: Mammogram ordered  Screening for colon cancer: Referred to GI service    DM2:  Fasting blood glucose level today of 120 suggesting improvement.  Currently compliant with metformin 1 g twice daily, glipizide extended release 10 mg daily, NovoLog 70/30 26 units twice daily, and Ozempic 1 mg weekly.  Laboratory evaluation pending collection; recommendations to follow.  Up-to-date with ophthalmologic evaluation; records requested  No known complications  Pure hypercholesterolemia with CMP and lipid panel pending collection: Continue atorvastatin 10 mg daily  No microalbuminuria with current urine for microalbumin pending collection: Continue losartan 25 mg daily    Right thyroid nodule:  Asymptomatic  Ultrasound ordered    Uterine leiomyoma:  Asymptomatic  Referred to gynecology service    Eczema:  Stable  Continue as needed topical management    The patient indicates understanding of these issues and agrees to the plan.  TODAY'S ORDERS     Orders Placed This Encounter   Procedures    TdaP (Adacel, Boostrix) [29263]       Meds & Refills:  Requested Prescriptions     Signed Prescriptions Disp Refills    Continuous Glucose Sensor (FREESTYLE MARVIN 3 SENSOR) Does not apply Misc 6 each 1     Si each every 14 (fourteen) days.    Continuous Glucose Sensor (FREESTYLE MARVIN 3 SENSOR) Does not apply Misc 6 each 1     Si each every 14 (fourteen)  days.    Continuous Glucose  (FREESTYLE MARVIN 3 READER) Does not apply Misc 1 each 0     Si each one time for 1 dose.       Imaging & Consults:  GASTRO - INTERNAL  OBG - INTERNAL  TETANUS, DIPHTHERIA TOXOIDS AND ACELLULAR PERTUSIS VACCINE (TDAP), >7 YEARS, IM USE  [facility] GLADYS 2D+3D SCREENING BILAT (CPT=77067/98157)  US THYROID (CPT=76536)    No follow-ups on file.  There are no Patient Instructions on file for this visit.    All questions were answered and the patient agrees with the plan.     Thank you,  Obie Booth MD

## 2024-07-26 ENCOUNTER — TELEPHONE (OUTPATIENT)
Dept: INTERNAL MEDICINE CLINIC | Facility: CLINIC | Age: 49
End: 2024-07-26

## 2024-07-26 DIAGNOSIS — E11.9 TYPE 2 DIABETES MELLITUS WITHOUT COMPLICATION, WITH LONG-TERM CURRENT USE OF INSULIN (HCC): Primary | ICD-10-CM

## 2024-07-26 DIAGNOSIS — Z79.4 TYPE 2 DIABETES MELLITUS WITHOUT COMPLICATION, WITH LONG-TERM CURRENT USE OF INSULIN (HCC): Primary | ICD-10-CM

## 2024-07-26 RX ORDER — ACYCLOVIR 400 MG/1
1 TABLET ORAL
Qty: 3 EACH | Refills: 2 | Status: SHIPPED | OUTPATIENT
Start: 2024-07-26

## 2024-07-26 NOTE — TELEPHONE ENCOUNTER
DR JOSUÉ CARRANZA, in attempting to answer questions in a PRIOR AUTHORIZATION on Freestyle Whitney 3 Sensors with pt's insurance, they sent the following question:    CGM Medical Necessity (ACCF, ACF, ACFC, Aetna FI ACF, Aetna FI AC ...  Medication:Continuous Glucose Sensor (FREESTYLE WHITNEY 3 SENSOR) Does not apply Misc More Information     The patient's drug benefit plan provides coverage for other products which may be considered for treating your patient. Can your patient be treated with a formulary product? Available Formulary Alternative: Dexcom continuous glucose monitor [NOTE: If yes, provide your patient with a new prescription for the formulary product.]    Please advise.

## 2024-07-26 NOTE — TELEPHONE ENCOUNTER
Dexcom 7 ordered for [facility] in [employer].  PRIOR AUTHORIZATION information for Freestyle Whitney indicates Dexcom is preferred with pt's insurance.

## 2024-08-05 ENCOUNTER — LAB ENCOUNTER (OUTPATIENT)
Dept: LAB | Age: 49
End: 2024-08-05
Attending: INTERNAL MEDICINE
Payer: COMMERCIAL

## 2024-08-05 DIAGNOSIS — E11.9 TYPE 2 DIABETES MELLITUS WITHOUT COMPLICATION, WITH LONG-TERM CURRENT USE OF INSULIN (HCC): ICD-10-CM

## 2024-08-05 DIAGNOSIS — Z79.4 TYPE 2 DIABETES MELLITUS WITHOUT COMPLICATION, WITH LONG-TERM CURRENT USE OF INSULIN (HCC): ICD-10-CM

## 2024-08-05 LAB
ALBUMIN SERPL-MCNC: 4.5 G/DL (ref 3.2–4.8)
ALBUMIN/GLOB SERPL: 1.6 {RATIO} (ref 1–2)
ALP LIVER SERPL-CCNC: 75 U/L
ALT SERPL-CCNC: 37 U/L
ANION GAP SERPL CALC-SCNC: 6 MMOL/L (ref 0–18)
AST SERPL-CCNC: 26 U/L (ref ?–34)
BILIRUB SERPL-MCNC: 0.4 MG/DL (ref 0.3–1.2)
BUN BLD-MCNC: 12 MG/DL (ref 9–23)
CALCIUM BLD-MCNC: 9.5 MG/DL (ref 8.7–10.4)
CHLORIDE SERPL-SCNC: 104 MMOL/L (ref 98–112)
CHOLEST SERPL-MCNC: 111 MG/DL (ref ?–200)
CO2 SERPL-SCNC: 29 MMOL/L (ref 21–32)
CREAT BLD-MCNC: 0.6 MG/DL
CREAT UR-SCNC: 113.9 MG/DL
EGFRCR SERPLBLD CKD-EPI 2021: 111 ML/MIN/1.73M2 (ref 60–?)
EST. AVERAGE GLUCOSE BLD GHB EST-MCNC: 183 MG/DL (ref 68–126)
FASTING PATIENT LIPID ANSWER: YES
FASTING STATUS PATIENT QL REPORTED: YES
GLOBULIN PLAS-MCNC: 2.8 G/DL (ref 2–3.5)
GLUCOSE BLD-MCNC: 116 MG/DL (ref 70–99)
HBA1C MFR BLD: 8 % (ref ?–5.7)
HDLC SERPL-MCNC: 41 MG/DL (ref 40–59)
LDLC SERPL CALC-MCNC: 50 MG/DL (ref ?–100)
MICROALBUMIN UR-MCNC: 0.6 MG/DL
MICROALBUMIN/CREAT 24H UR-RTO: 5.3 UG/MG (ref ?–30)
NONHDLC SERPL-MCNC: 70 MG/DL (ref ?–130)
OSMOLALITY SERPL CALC.SUM OF ELEC: 289 MOSM/KG (ref 275–295)
POTASSIUM SERPL-SCNC: 4.5 MMOL/L (ref 3.5–5.1)
PROT SERPL-MCNC: 7.3 G/DL (ref 5.7–8.2)
SODIUM SERPL-SCNC: 139 MMOL/L (ref 136–145)
TRIGL SERPL-MCNC: 106 MG/DL (ref 30–149)
VLDLC SERPL CALC-MCNC: 15 MG/DL (ref 0–30)

## 2024-08-05 PROCEDURE — 80061 LIPID PANEL: CPT | Performed by: INTERNAL MEDICINE

## 2024-08-05 PROCEDURE — 82570 ASSAY OF URINE CREATININE: CPT | Performed by: INTERNAL MEDICINE

## 2024-08-05 PROCEDURE — 83036 HEMOGLOBIN GLYCOSYLATED A1C: CPT | Performed by: INTERNAL MEDICINE

## 2024-08-05 PROCEDURE — 82043 UR ALBUMIN QUANTITATIVE: CPT | Performed by: INTERNAL MEDICINE

## 2024-08-05 PROCEDURE — 80053 COMPREHEN METABOLIC PANEL: CPT | Performed by: INTERNAL MEDICINE

## 2024-08-13 RX ORDER — SEMAGLUTIDE 1.34 MG/ML
1 INJECTION, SOLUTION SUBCUTANEOUS WEEKLY
Qty: 9 ML | Refills: 0 | Status: SHIPPED | OUTPATIENT
Start: 2024-08-13

## 2024-08-13 NOTE — TELEPHONE ENCOUNTER
Please review; protocol failed/No Protocol    Requested Prescriptions   Pending Prescriptions Disp Refills    OZEMPIC, 1 MG/DOSE, 4 MG/3ML Subcutaneous Solution Pen-injector [Pharmacy Med Name: OZEMPIC 4 MG/3 ML (1 MG/DOSE)]  0     Sig: INJECT 1MG INTO THE SKIN ONCE A WEEK       Diabetes Medication Protocol Failed - 8/9/2024 12:08 PM        Failed - Last A1C < 7.5 and within past 6 months     Lab Results   Component Value Date    A1C 8.0 (H) 08/05/2024             Passed - In person appointment or virtual visit in the past 6 mos or appointment in next 3 mos     Recent Outpatient Visits              2 weeks ago Routine general medical examination [address] a health care facility    [address]    Office Visit    1 year ago Routine general medical examination at a health care facility    [address] Obie Jansen MD    Office Visit    1 year ago Type 2 diabetes mellitus without complication, with long-term current use of insulin ([facility])    [address] Obie Jansen MD    Office Visit    2 years ago Routine general medical examination at a health care facility    [address] Obie Jansen MD    Office Visit    3 years ago Controlled type 2 diabetes mellitus with insulin therapy ([facility])    [address] Obie Jansen MD    Office Visit          Future Appointments         Provider Department Appt Notes    In 1 month Crystal Allen DO [facility] 7/26/24: Screen colon with Dr. Allen on 9/20/24 @ 10:15am. Sent standard prep  and pp. BCBS/andreaT                    Passed - Microalbumin procedure in past 12 months or taking ACE/ARB        Passed - EGFRCR or GFRNAA > 50     GFR Evaluation  EGFRCR: 111 , resulted on 8/5/2024          Passed - GFR in the past 12 months           Future Appointments          Provider Department Appt Notes    In 1 month Crystal Allen DO [facility] 7/26/24: Screen colon with Dr. Allen on 9/20/24 @ 10:15am. Sent standard prep  and pp. BCBS/andreaT          Recent Outpatient Visits              2 weeks ago Routine general medical examination at a health care facility    [facility], [address] [place], [place] [address]Rosy Amish, MD    Office Visit    1 year ago Routine general medical examination at a health care facility    [facility] [address] [place], [place] [address]Rosy Amish, MD    Office Visit    1 year ago Type 2 diabetes mellitus without complication, with long-term current use of insulin (HCC)    [facility] [address] [place], [place] [address]Rosy Amish, MD    Office Visit    2 years ago Routine general medical examination at a health care facility    [facility], [address] [place], [place] [address]Rosy Amish, MD    Office Visit    3 years ago Controlled type 2 diabetes mellitus with insulin therapy (HCC)    [facility] [address] [place], [place] [address]Rosy Amish, MD    Office Visit

## 2024-08-22 ENCOUNTER — TELEPHONE (OUTPATIENT)
Dept: INTERNAL MEDICINE CLINIC | Facility: CLINIC | Age: 49
End: 2024-08-22

## 2024-08-22 DIAGNOSIS — E11.9 TYPE 2 DIABETES MELLITUS WITHOUT COMPLICATION, WITH LONG-TERM CURRENT USE OF INSULIN (HCC): Primary | ICD-10-CM

## 2024-08-22 DIAGNOSIS — Z79.4 TYPE 2 DIABETES MELLITUS WITHOUT COMPLICATION, WITH LONG-TERM CURRENT USE OF INSULIN (HCC): Primary | ICD-10-CM

## 2024-08-22 NOTE — TELEPHONE ENCOUNTER
CGM Medical Necessity (ACCF, ACF, ACFC, Aetna FI ACF, Aetna FI AC ...  Medication:Continuous Glucose Sensor (FREESTYLE MARVIN 3 SENSOR) Does not apply Misc More Information     View the most recent questions/answers  The patient's drug benefit plan provides coverage for other products which may be considered for treating your patient. Can your patient be treated with a formulary product? Available Formulary Alternative: Dexcom continuous glucose monitor [NOTE: If yes, provide your patient with a new prescription for the formulary product.]    DR JOSUÉ CARRANZA, tried to submit a PRIOR AUTHORIZATION through [employer].  Questions came back with alternative Dexcom.   Would you be able to Dexcom for pt which insurance is indicating they cover?

## 2024-08-29 NOTE — TELEPHONE ENCOUNTER
Preferred formulary alternative to Freestyle Whitney 3 is Dexcom.    CGM Medical Necessity (ACCF, ACF, ACFC, Aetna FI ACF, Aetna FI AC ...  Medication:Continuous Glucose Sensor (FREESTYLE WHITNEY 3 SENSOR) Does not apply Misc More Information     View the most recent questions/answers for PRIOR AUTHORIZATION:  The patient's drug benefit plan provides coverage for other products which may be considered for treating your patient. Can your patient be treated with a formulary product? Available Formulary Alternative: Dexcom continuous glucose monitor [NOTE: If yes, provide your patient with a new prescription for the formulary product.]    Case ID: [id number]   Fax: [phone]       Please order Dexcom for pt if acceptable.

## 2024-09-02 RX ORDER — ACYCLOVIR 400 MG/1
1 TABLET ORAL DAILY PRN
Qty: 1 EACH | Refills: 0 | Status: SHIPPED | OUTPATIENT
Start: 2024-09-02

## 2024-09-02 RX ORDER — BLOOD-GLUCOSE SENSOR
1 EACH MISCELLANEOUS
Qty: 3 EACH | Refills: 11 | Status: SHIPPED | OUTPATIENT
Start: 2024-09-02

## 2024-09-03 NOTE — TELEPHONE ENCOUNTER
Called Pharmacy to see if Dexcom G7 is covered.  They spoke to  and it requires a PA and letter or medical necessity to be covered.    Dr. Booth,  can you write a letter of medical necessity for Dexcom G7 CGM    Covered Indications    I-CGMs are class III medical devices that require premarket approval by the FDA. In order to be considered reasonable and necessary, the FDA approved indication must include use as a therapeutic I-CGM.(3) The FDA recently approved expanding the indications of an implantable I-CGM product to replace fingerstick blood glucose measurements for diabetes treatment decisions.    Therapeutic I-CGMs are considered reasonable and necessary by Medicare when all of the following coverage criteria (1-4) are met:    The beneficiary has diabetes mellitus (Refer to the related Billing and Coding Article (QY74816) for applicable diagnoses); and,  The beneficiary is insulin-treated with multiple (three or more) daily administrations of insulin or a Medicare-covered continuous subcutaneous insulin infusion (CSII) pump; and,  The beneficiary’s insulin treatment regimen requires frequent adjustment by the beneficiary on the basis of BGM or CGM testing results; and,  Within six (6) months prior to ordering the I-CGM, the treating practitioner has an in-person visit with the beneficiary to evaluate their diabetes control and determined that criteria (1-4) above are met.  Limitations    I-CGM devices will not be considered reasonable and necessary for the following:    Exception: For those beneficiaries who have previously met the coverage criteria for a non-implantable therapeutic continuous glucose monitor through the Medicare DME benefit and subsequently choose to switch to the implantable device, they may do so with a provider order. However, all other coverage criteria above must be fulfilled in order for Medicare payment.    Notice: Services performed for any given diagnosis  must meet all of the indications and limitations stated in this policy, the general requirements for medical necessity as stated in CMS payment policy manuals, any and all existing CMS national coverage determinations, and all Medicare payment rules.

## 2024-09-03 NOTE — TELEPHONE ENCOUNTER
Patient is active on MyChart.  MyChart message sent to patient. Postponed message to ensure read.  If not read, nurse will reach out to patient.

## 2024-09-20 PROBLEM — Z12.11 SPECIAL SCREENING FOR MALIGNANT NEOPLASM OF COLON: Status: ACTIVE | Noted: 2024-09-20

## 2024-10-24 RX ORDER — INSULIN ASPART 100 [IU]/ML
26 INJECTION, SUSPENSION SUBCUTANEOUS 2 TIMES DAILY
Qty: 45 ML | Refills: 0 | Status: SHIPPED | OUTPATIENT
Start: 2024-10-24

## 2024-10-24 NOTE — TELEPHONE ENCOUNTER
Please review; protocol failed    No future appointments with family medicine/internal medicine.  Last office visit: 7/25/2024    Requested Prescriptions   Pending Prescriptions Disp Refills    NovoLOG Mix 70/30 FlexPen 100 UNIT/ML Susp Pen-injector (Insulin Aspart Prot & Aspart 70/30) [Pharmacy Med Name: NOVOLOG MIX 70-30 FLEXPEN]  1     Sig: Inject 26 Units into the skin 2 (two) times daily.       Diabetes Medication Protocol Failed - 10/24/2024  1:36 PM        Failed - Last A1C < 7.5 and within past 6 months     Lab Results   Component Value Date    A1C 8.0 (H) 08/05/2024             Passed - In person appointment or virtual visit in the past 6 mos or appointment in next 3 mos     Recent Outpatient Visits              3 months ago Routine general medical examination at a health care facility    [address]    Office Visit    1 year ago Routine general medical examination at a health care facility    [address]    Office Visit    2 years ago Type 2 diabetes mellitus without complication, with long-term current use of insulin ([facility])    [address]    Office Visit    2 years ago Routine general medical examination at a health care facility    [address]    Office Visit    3 years ago Controlled type 2 diabetes mellitus with insulin therapy ([facility])    [address]    Office Visit          Future Appointments         Provider Department Appt Notes    In 6 days PFS DEXA RM 1; PFS XR RM1; PFS ALEKSANDAR RM1 [facility] Mammography [facility]     In 2 weeks PF US RM3 [facility] Ultrasound [facility] Follow up on incidental finding                    Passed - Microalbumin procedure in past 12 months or taking ACE/ARB         Passed - EGFRCR or GFRNAA > 50     GFR Evaluation  EGFRCR: 111 , resulted on 8/5/2024          Passed - GFR in the past 12 months             Future Appointments         Provider Department Appt Notes    In 6 days PFS DEXA RM 1; PFS XR RM1; PFS ALEKSANDAR RM1 [facility] Mammography [facility]     In 2 weeks PF US RM3 [facility] Ultrasound [facility] Follow up on incidental finding          Recent Outpatient Visits              3 months ago Routine general medical examination at a health care facility    [facility] [address] [place], [place] [address]Rosy Amish, MD    Office Visit    1 year ago Routine general medical examination at a health care facility    [facility] [address] [place], [place] [address]Rosy Amish, MD    Office Visit    2 years ago Type 2 diabetes mellitus without complication, with long-term current use of insulin ([facility])    [facility] Rosy Cifuentes Amish, MD    Office Visit    2 years ago Routine general medical examination at a health care facility    [facility] Rosy Cifuentes Amish, MD    Office Visit    3 years ago Controlled type 2 diabetes mellitus with insulin therapy (HCC)    [facility] [address] [place], [place] [address]Rosy Amish, MD    Office Visit

## 2024-10-30 ENCOUNTER — HOSPITAL ENCOUNTER (OUTPATIENT)
Dept: MAMMOGRAPHY | Age: 49
Discharge: HOME | End: 2024-10-30
Attending: INTERNAL MEDICINE
Payer: COMMERCIAL

## 2024-10-30 DIAGNOSIS — Z12.31 ENCOUNTER FOR SCREENING MAMMOGRAM FOR MALIGNANT NEOPLASM OF BREAST: ICD-10-CM

## 2024-10-30 PROCEDURE — 77067 SCR MAMMO BI INCL CAD: CPT | Performed by: INTERNAL MEDICINE

## 2024-10-30 PROCEDURE — 77063 BREAST TOMOSYNTHESIS BI: CPT | Performed by: INTERNAL MEDICINE

## 2024-11-07 ENCOUNTER — HOSPITAL ENCOUNTER (OUTPATIENT)
Dept: ULTRASOUND IMAGING | Age: 49
Discharge: HOME | End: 2024-11-07
Attending: INTERNAL MEDICINE
Payer: COMMERCIAL

## 2024-11-07 DIAGNOSIS — E04.1 RIGHT THYROID NODULE: ICD-10-CM

## 2024-11-07 PROCEDURE — 76536 US EXAM OF HEAD AND NECK: CPT | Performed by: INTERNAL MEDICINE

## 2024-11-08 DIAGNOSIS — E11.9 CONTROLLED TYPE 2 DIABETES MELLITUS WITH INSULIN THERAPY (HCC): ICD-10-CM

## 2024-11-08 DIAGNOSIS — Z79.4 CONTROLLED TYPE 2 DIABETES MELLITUS WITH INSULIN THERAPY (HCC): ICD-10-CM

## 2024-11-12 RX ORDER — GLIPIZIDE 10 MG/1
10 TABLET, FILM COATED, EXTENDED RELEASE ORAL DAILY
Qty: 90 TABLET | Refills: 3 | Status: SHIPPED | OUTPATIENT
Start: 2024-11-12

## 2024-11-12 NOTE — TELEPHONE ENCOUNTER
Please review; protocol failed    No future appointments with family medicine/internal medicine.  Last office visit: 7/25/2024    Requested Prescriptions   Pending Prescriptions Disp Refills    METFORMIN HCL 1000 MG Oral Tab [Pharmacy Med Name: METFORMIN HCL 1,000 MG TABLET] 180 tablet 0     Sig: TAKE 1 TABLET BY MOUTH TWICE A DAY WITH MEALS       Diabetes Medication Protocol Failed - 11/12/2024  2:26 PM        Failed - Last A1C < 7.5 and within past 6 months     Lab Results   Component Value Date    A1C 8.0 (H) 08/05/2024             Passed - In person appointment or virtual visit in the past 6 mos or appointment in next 3 mos     Recent Outpatient Visits              3 months ago Routine general medical examination at a health care facility    [address]    Office Visit    1 year ago Routine general medical examination at a health care facility    [address]    Office Visit    2 years ago Type 2 diabetes mellitus without complication, with long-term current use of insulin ([facility])    [address]    Office Visit    3 years ago Routine general medical examination at a health care facility    [address]    Office Visit    3 years ago Controlled type 2 diabetes mellitus with insulin therapy ([facility])    [address]    Office Visit          Future Appointments         Provider Department Appt Notes    In 1 week Malgorzata Alvarez MD [place], [facility] thyroid                    Passed - Microalbumin procedure in past 12 months or taking ACE/ARB        Passed - EGFRCR or GFRNAA > 50     GFR Evaluation  EGFRCR: 111 , resulted on 8/5/2024          Passed - GFR in the past 12 months           GLIPIZIDE ER 10 MG Oral Tablet 24 Hr [Pharmacy Med Name: GLIPIZIDE ER 10 MG TABLET] 90 tablet 0     Sig: TAKE 1 TABLET BY MOUTH EVERY DAY       Diabetes Medication Protocol Failed - 11/12/2024  2:26 PM        Failed - Last A1C < 7.5 and within past 6 months     Lab Results   Component Value Date    A1C 8.0 (H) 08/05/2024             Passed - In person appointment or virtual visit in the past 6 mos or appointment in next 3 mos     Recent Outpatient Visits              3 months ago Routine general medical examination at a health care facility    [address] Obie Jansen MD    Office Visit    1 year ago Routine general medical examination at a health care facility    [address]    Office Visit    2 years ago Type 2 diabetes mellitus without complication, with long-term current use of insulin ([facility])    [address]    Office Visit    3 years ago Routine general medical examination at a health care facility    [address]    Office Visit    3 years ago Controlled type 2 diabetes mellitus with insulin therapy ([facility])    [address] Obie Jansen MD    Office Visit          Future Appointments         Provider Department Appt Notes    In 1 week Malgorzata Alvarez MD [facility], [facility] Rosy Pillai thyroid                    Passed - Microalbumin procedure in past 12 months or taking ACE/ARB        Passed - EGFRCR or GFRNAA > 50     GFR Evaluation  EGFRCR: 111 , resulted on 8/5/2024          Passed - GFR in the past 12 months             Future Appointments         Provider Department Appt Notes    In 1 week Malgorzata Alvarez MD [facility], [address] thyroid          Recent Outpatient Visits              3  months ago Routine general medical examination at a health care facility    [facility], [address] [place], [place] [address]Rosy Amish, MD    Office Visit    1 year ago Routine general medical examination at a health care facility    [facility], [address] [place], [place] [address]Rosy Amish, MD    Office Visit    2 years ago Type 2 diabetes mellitus without complication, with long-term current use of insulin (HCC)    [facility] [address] [place], [place] [address]Rosy Amish, MD    Office Visit    3 years ago Routine general medical examination at a health care facility    [facility], [address] [place], [place] [address]Rosy Amish, MD    Office Visit    3 years ago Controlled type 2 diabetes mellitus with insulin therapy (HCC)    [facility] [address] [place], [place] [address]Rosy Amish, MD    Office Visit

## 2024-11-20 ENCOUNTER — OFFICE VISIT (OUTPATIENT)
Facility: LOCATION | Age: 49
End: 2024-11-20
Payer: COMMERCIAL

## 2024-11-20 DIAGNOSIS — E04.1 THYROID NODULE: Primary | ICD-10-CM

## 2024-11-20 PROCEDURE — 99202 OFFICE O/P NEW SF 15 MIN: CPT | Performed by: STUDENT IN AN ORGANIZED HEALTH CARE EDUCATION/TRAINING PROGRAM

## 2024-11-20 NOTE — PROGRESS NOTES
Bogdan Solares is a 49 year old female.   Chief Complaint   Patient presents with    Thyroid Problem     HPI:   49 year old female hx DM, HTN, HLD presenting for evaluation of a thyroid nodule.  She reports having a nodule noticed years ago on a heart scan but did not have it evaluated.  She mentioned it more recently to her PCP who obtained a thyroid ultrasound.  She denies hair, skin, and nail changes.  No palpitations. Most recent TSH WNL.  She denies a history of radiation exposure.  No family history of head and neck malignancy. Works at [employer].     Never smoker, no alcohol or drug use     Current Outpatient Medications   Medication Sig Dispense Refill    metFORMIN HCl 1000 MG Oral Tab Take 1 tablet (1,000 mg total) by mouth 2 (two) times daily with meals. 180 tablet 3    glipiZIDE ER 10 MG Oral Tablet 24 Hr Take 1 tablet (10 mg total) by mouth daily. 90 tablet 3    NovoLOG Mix 70/30 FlexPen 100 UNIT/ML Susp Pen-injector (Insulin Aspart Prot & Aspart 70/30) Inject 26 Units into the skin 2 (two) times daily. **Please complete labs for further refills. 45 mL 0    Continuous Glucose Sensor (DEXCOM G7 SENSOR) Does not apply Misc 1 each Every 10 days. Use as directed every 10 days 3 each 11    Continuous Glucose  (DEXCOM G7 ) Does not apply Device 1 each daily as needed. 1 each 0    semaglutide (OZEMPIC, 1 MG/DOSE,) 4 MG/3ML Subcutaneous Solution Pen-injector Inject 1 mg into the skin once a week. 9 mL 0    PEG 3350-KCl-NaBcb-NaCl-NaSulf (PEG-3350/ELECTROLYTES) 236 g Oral Recon Soln Take as directed by physician 4000 mL 0    losartan 25 MG Oral Tab Take 1 tablet (25 mg total) by mouth daily. 90 tablet 3    atorvastatin 10 MG Oral Tab Take 1 tablet (10 mg total) by mouth nightly. 90 tablet 3    Ascorbic Acid (VITAMIN C) 1000 MG Oral Tab Take 1 tablet (1,000 mg total) by mouth daily.      triamcinolone 0.1 % External Cream Apply topically 2 (two) times daily as needed. 60 g 3    Omega-3 1000 MG  Oral Cap daily.      Cyanocobalamin (B-12) 500 MCG Oral Tab Take by mouth.      Cholecalciferol (VITAMIN D) 1000 UNITS Oral Tab Take 1 tablet by mouth daily.        Past Medical History:    Anemia    Uterine Fibroids    Diabetes mellitus (HCC)    Dyslipidemia    Fibrocystic breast changes    Heavy menses    High cholesterol    Long QT syndrome    Microalbuminuria    Type II or unspecified type diabetes mellitus without mention of complication, not stated as uncontrolled    Unspecified sinusitis (chronic)    Wears glasses      Social History:  Social History     Socioeconomic History    Marital status:    Tobacco Use    Smoking status: Never    Smokeless tobacco: Never   Vaping Use    Vaping status: Never Used   Substance and Sexual Activity    Alcohol use: No    Drug use: No    Sexual activity: Yes     Partners: Male     Birth control/protection: Condom   Other Topics Concern    Caffeine Concern Yes     Comment: 1 cup of coffee x day    Exercise No    Seat Belt Yes      Past Surgical History:   Procedure Laterality Date    Daphne biopsy stereo nodule 1 site right (cpt=19081) Right 05/2019    benign         REVIEW OF SYSTEMS:   GENERAL HEALTH: feels well otherwise  GENERAL : denies fever, chills, sweats, weight loss, weight gain  SKIN: denies any unusual skin lesions or rashes  RESPIRATORY: denies shortness of breath with exertion  NEURO: denies headaches    EXAM:   LMP 10/26/2024     System Findings Details   Constitutional  Overall appearance - Normal.   Head/Face  Facial features -- Normal. Skull - Normal.   Eyes  Sclera white, pupils equal and round, EOMI   Ears  External ears normal in appearance, EACs patent, TM intact, no evidence of middle ear effusion   Nose  External nose normal in appearance, nares patent, septum straight, no epistaxis   Throat  Posterior pharynx clear, uvula midline, tonsils 1+   Oral cavity  Lips normal in appearance, mucous membranes clear, no masses, FOM soft, tongue without  lesions   Neck  Trachea midline, no lymphadenopathy, no masses, no palpable thyroid nodule   Neurological  Memory - Normal. Cranial nerves - Cranial nerves II through XII grossly intact.     US Thyroid 11/7/2024  FINDINGS:       MEASUREMENTS:  RIGHT LOBE:  4.6 x 1.6 x 1.5 cm  LEFT LOBE:  4.9 x 1.4 x 1.7 cm  ISTHMUS:  0.1 cm     NODULES:  A peripheral calcified nodule in the right mid thyroid measures up to 1.3 x 1.1 x 1.0 cm.  The nodule is likely solid.  The nodule is very hypoechoic.  The nodule is wider than tall.  TR5 - Highly Suspicious (FNA if > or = 1 cm.  Follow if > or   = 0.5 cm.).         OTHER:  None.                   Impression   CONCLUSION:  Peripherally calcified nodule in the right thyroid.  Ultrasound-guided biopsy is recommended.        7/27/2023: TSH 1.14 (WNL)    ASSESSMENT AND PLAN:   49 y F with thyroid nodule.     -Ultrasound guided FNA ordered  -Follow-up after biopsy    The patient indicates understanding of these issues and agrees to the plan.    Malgorzata Alvarez MD  11/20/2024  2:06 PM

## 2024-11-26 ENCOUNTER — LAB ENCOUNTER (OUTPATIENT)
Dept: LAB | Age: 49
End: 2024-11-26
Attending: INTERNAL MEDICINE
Payer: COMMERCIAL

## 2024-11-26 DIAGNOSIS — Z79.4 TYPE 2 DIABETES MELLITUS WITHOUT COMPLICATION, WITH LONG-TERM CURRENT USE OF INSULIN (HCC): ICD-10-CM

## 2024-11-26 DIAGNOSIS — E11.9 TYPE 2 DIABETES MELLITUS WITHOUT COMPLICATION, WITH LONG-TERM CURRENT USE OF INSULIN (HCC): ICD-10-CM

## 2024-11-26 LAB
ALBUMIN SERPL-MCNC: 4.2 G/DL (ref 3.2–4.8)
ALBUMIN/GLOB SERPL: 1.4 {RATIO} (ref 1–2)
ALP LIVER SERPL-CCNC: 62 U/L
ALT SERPL-CCNC: 27 U/L
ANION GAP SERPL CALC-SCNC: 7 MMOL/L (ref 0–18)
AST SERPL-CCNC: 23 U/L (ref ?–34)
BILIRUB SERPL-MCNC: 0.5 MG/DL (ref 0.3–1.2)
BUN BLD-MCNC: 12 MG/DL (ref 9–23)
CALCIUM BLD-MCNC: 9.3 MG/DL (ref 8.7–10.4)
CHLORIDE SERPL-SCNC: 107 MMOL/L (ref 98–112)
CHOLEST SERPL-MCNC: 103 MG/DL (ref ?–200)
CO2 SERPL-SCNC: 27 MMOL/L (ref 21–32)
CREAT BLD-MCNC: 0.49 MG/DL
EGFRCR SERPLBLD CKD-EPI 2021: 117 ML/MIN/1.73M2 (ref 60–?)
EST. AVERAGE GLUCOSE BLD GHB EST-MCNC: 180 MG/DL (ref 68–126)
FASTING PATIENT LIPID ANSWER: YES
FASTING STATUS PATIENT QL REPORTED: YES
GLOBULIN PLAS-MCNC: 3.1 G/DL (ref 2–3.5)
GLUCOSE BLD-MCNC: 84 MG/DL (ref 70–99)
HBA1C MFR BLD: 7.9 % (ref ?–5.7)
HDLC SERPL-MCNC: 39 MG/DL (ref 40–59)
LDLC SERPL CALC-MCNC: 50 MG/DL (ref ?–100)
NONHDLC SERPL-MCNC: 64 MG/DL (ref ?–130)
OSMOLALITY SERPL CALC.SUM OF ELEC: 291 MOSM/KG (ref 275–295)
POTASSIUM SERPL-SCNC: 3.8 MMOL/L (ref 3.5–5.1)
PROT SERPL-MCNC: 7.3 G/DL (ref 5.7–8.2)
SODIUM SERPL-SCNC: 141 MMOL/L (ref 136–145)
TRIGL SERPL-MCNC: 61 MG/DL (ref 30–149)
VLDLC SERPL CALC-MCNC: 9 MG/DL (ref 0–30)

## 2024-11-26 PROCEDURE — 80053 COMPREHEN METABOLIC PANEL: CPT | Performed by: INTERNAL MEDICINE

## 2024-11-26 PROCEDURE — 80061 LIPID PANEL: CPT | Performed by: INTERNAL MEDICINE

## 2024-11-26 PROCEDURE — 83036 HEMOGLOBIN GLYCOSYLATED A1C: CPT | Performed by: INTERNAL MEDICINE

## 2024-11-29 ENCOUNTER — HOSPITAL ENCOUNTER (OUTPATIENT)
Dept: ULTRASOUND IMAGING | Facility: HOSPITAL | Age: 49
Discharge: HOME | End: 2024-11-29
Attending: STUDENT IN AN ORGANIZED HEALTH CARE EDUCATION/TRAINING PROGRAM
Payer: COMMERCIAL

## 2024-11-29 DIAGNOSIS — E04.1 THYROID NODULE: ICD-10-CM

## 2024-11-29 PROCEDURE — 10005 FNA BX W/US GDN 1ST LES: CPT | Performed by: STUDENT IN AN ORGANIZED HEALTH CARE EDUCATION/TRAINING PROGRAM

## 2024-11-29 PROCEDURE — 88173 CYTOPATH EVAL FNA REPORT: CPT | Performed by: STUDENT IN AN ORGANIZED HEALTH CARE EDUCATION/TRAINING PROGRAM

## 2024-11-29 NOTE — PROCEDURES
PROCEDURE: US FNA THYROID, GUIDED INCLD, FIRST LESION (CPT=10005)    COMPARISON: [place], [place], US THYROID (CPT=76536), 11/07/2024, 11:07 AM.    INDICATIONS: E04.1 Thyroid nodule    DESCRIPTION: The risks, benefits, and alternatives of the procedure were explained to the patient and witnessed informed written and verbal consent was documented in the chart. Time out was performed by the staff. Potential complications including bleeding, infection, and the possibility of necessity for repeat biopsy due to under sampling were discussed with the patient and they related understanding. After obtaining informed consent, an ultrasound-guided FNA was performed in the usual sterile manner. The neck was prepped and draped in sterile fashion and 1% lidocaine was used for local anesthetic.    BIOPSY NEEDLE: 25 gauge FNA  SPECIMEN TYPE, #, LOCATION: Eight passes performed in a densely calcified right thyroid nodule measuring up to 13 mm.   MEDICATION: 1% lidocaine for local anesthetic  COMPLICATIONS: None.  PATHOLOGY LAB: Pathology present for assessment of adequacy of sampling.    CONCLUSION: Uneventful ultrasound guided FNA.  The patient was instructed to obtain follow up care and FNA results from the referring physician.

## 2024-12-06 ENCOUNTER — PATIENT MESSAGE (OUTPATIENT)
Dept: INTERNAL MEDICINE CLINIC | Facility: CLINIC | Age: 49
End: 2024-12-06

## 2024-12-06 NOTE — TELEPHONE ENCOUNTER
Routing to Dr. Booth for review/recs. Patient wanting to discontinue ozempic, asking if you recommend alternative or what you recommend for her DM regimen.

## 2024-12-06 NOTE — TELEPHONE ENCOUNTER
HbA1c recently resulted at 7.9%; needs prompt follow-up with me or partner. Please schedule this follow-up for diabetes management within 3 weeks. Agree with concern regarding ozempic use and thyroid cancer; discontinue ozempic. Again, needs follow-up as she is due for care gaps (eye exam, pap smear) and diabetes management.

## 2024-12-09 ENCOUNTER — TELEPHONE (OUTPATIENT)
Facility: LOCATION | Age: 49
End: 2024-12-09

## 2024-12-09 DIAGNOSIS — E04.1 THYROID NODULE: Primary | ICD-10-CM

## 2024-12-09 NOTE — TELEPHONE ENCOUNTER
Patient scheduled on below for diabetic f/u.  Closing TELEPHONE ENCOUNTER.  Future Appointments   Date Time Provider Department Center   12/13/2024 11:20 AM Obie Booth MD [facility]

## 2024-12-13 ENCOUNTER — OFFICE VISIT (OUTPATIENT)
Dept: INTERNAL MEDICINE CLINIC | Facility: CLINIC | Age: 49
End: 2024-12-13
Payer: COMMERCIAL

## 2024-12-13 VITALS
DIASTOLIC BLOOD PRESSURE: 74 MMHG | SYSTOLIC BLOOD PRESSURE: 112 MMHG | BODY MASS INDEX: 24.47 KG/M2 | WEIGHT: 139.81 LBS | HEART RATE: 79 BPM | HEIGHT: 63.39 IN | RESPIRATION RATE: 18 BRPM | OXYGEN SATURATION: 98 % | TEMPERATURE: 97 F

## 2024-12-13 DIAGNOSIS — E11.9 TYPE 2 DIABETES MELLITUS WITHOUT COMPLICATION, WITH LONG-TERM CURRENT USE OF INSULIN (HCC): Primary | ICD-10-CM

## 2024-12-13 DIAGNOSIS — E78.00 PURE HYPERCHOLESTEROLEMIA: ICD-10-CM

## 2024-12-13 DIAGNOSIS — E04.1 RIGHT THYROID NODULE: ICD-10-CM

## 2024-12-13 DIAGNOSIS — Z79.4 TYPE 2 DIABETES MELLITUS WITHOUT COMPLICATION, WITH LONG-TERM CURRENT USE OF INSULIN (HCC): Primary | ICD-10-CM

## 2024-12-13 DIAGNOSIS — D64.9 NORMOCYTIC ANEMIA: ICD-10-CM

## 2024-12-13 PROCEDURE — 3078F DIAST BP <80 MM HG: CPT | Performed by: INTERNAL MEDICINE

## 2024-12-13 PROCEDURE — 3052F HG A1C>EQUAL 8.0%<EQUAL 9.0%: CPT | Performed by: INTERNAL MEDICINE

## 2024-12-13 PROCEDURE — 3008F BODY MASS INDEX DOCD: CPT | Performed by: INTERNAL MEDICINE

## 2024-12-13 PROCEDURE — G2211 COMPLEX E/M VISIT ADD ON: HCPCS | Performed by: INTERNAL MEDICINE

## 2024-12-13 PROCEDURE — 3061F NEG MICROALBUMINURIA REV: CPT | Performed by: INTERNAL MEDICINE

## 2024-12-13 PROCEDURE — 3051F HG A1C>EQUAL 7.0%<8.0%: CPT | Performed by: INTERNAL MEDICINE

## 2024-12-13 PROCEDURE — 99214 OFFICE O/P EST MOD 30 MIN: CPT | Performed by: INTERNAL MEDICINE

## 2024-12-13 PROCEDURE — 3074F SYST BP LT 130 MM HG: CPT | Performed by: INTERNAL MEDICINE

## 2024-12-13 RX ORDER — PIOGLITAZONE 30 MG/1
30 TABLET ORAL DAILY
Qty: 90 TABLET | Refills: 0 | Status: SHIPPED | OUTPATIENT
Start: 2024-12-13

## 2024-12-13 NOTE — PROGRESS NOTES
Bogdan Solares  5/28/1975    Chief Complaint   Patient presents with    Diabetes     EJ RM 7- Pt is here for DM f/u       SUBJECTIVE   Bogdan Solares is a 49 year old female who presents as a follow-up.    Since last evaluation the patient has maintained overall stable dietary and lifestyle habits.  Most recent hemoglobin A1c is 7.9%, compared with prior study of 8.0% in August. LDL is 50. She has maintained compliance with ozempic 1 mg weekly, with approximately two days of nausea experienced following each dose.  She has remained weight stable.  In follow-up to a known right thyroid nodule, ultrasound evaluation was completed and FNA recently revealed atypia of undetermined significance.  She is awaiting further evaluation by the ENT service.  She denies any acute concerns at this time.    Review of Systems   No f/c/chest pain or sob. No cough. No abd pain/n/v/d. No ha or dizziness. No numbness, tingling, or weakness. No other complaints today.    Current Outpatient Medications   Medication Sig Dispense Refill    pioglitazone 30 MG Oral Tab Take 1 tablet (30 mg total) by mouth daily. 90 tablet 0    metFORMIN HCl 1000 MG Oral Tab Take 1 tablet (1,000 mg total) by mouth 2 (two) times daily with meals. 180 tablet 3    glipiZIDE ER 10 MG Oral Tablet 24 Hr Take 1 tablet (10 mg total) by mouth daily. 90 tablet 3    NovoLOG Mix 70/30 FlexPen 100 UNIT/ML Susp Pen-injector (Insulin Aspart Prot & Aspart 70/30) Inject 26 Units into the skin 2 (two) times daily. **Please complete labs for further refills. 45 mL 0    losartan 25 MG Oral Tab Take 1 tablet (25 mg total) by mouth daily. 90 tablet 3    atorvastatin 10 MG Oral Tab Take 1 tablet (10 mg total) by mouth nightly. 90 tablet 3    Ascorbic Acid (VITAMIN C) 1000 MG Oral Tab Take 1 tablet (1,000 mg total) by mouth daily.      triamcinolone 0.1 % External Cream Apply topically 2 (two) times daily as needed. 60 g 3    Omega-3 1000 MG Oral Cap daily.      Cyanocobalamin (B-12)  500 MCG Oral Tab Take by mouth.      Cholecalciferol (VITAMIN D) 1000 UNITS Oral Tab Take 1 tablet by mouth daily.      Continuous Glucose Sensor (DEXCOM G7 SENSOR) Does not apply Misc 1 each Every 10 days. Use as directed every 10 days 3 each 11    Continuous Glucose  (DEXCOM G7 ) Does not apply Device 1 each daily as needed. 1 each 0    semaglutide (OZEMPIC, 1 MG/DOSE,) 4 MG/3ML Subcutaneous Solution Pen-injector Inject 1 mg into the skin once a week. (Patient not taking: Reported on 12/13/2024) 9 mL 0    PEG 3350-KCl-NaBcb-NaCl-NaSulf (PEG-3350/ELECTROLYTES) 236 g Oral Recon Soln Take as directed by physician 4000 mL 0      Allergies[1]   Past Medical History:    Anemia    Uterine Fibroids    Diabetes mellitus (HCC)    Dyslipidemia    Fibrocystic breast changes    Heavy menses    High cholesterol    Long QT syndrome    Microalbuminuria    Type II or unspecified type diabetes mellitus without mention of complication, not stated as uncontrolled    Unspecified sinusitis (chronic)    Wears glasses      Patient Active Problem List   Diagnosis    Iron deficiency anemia    Fibroid uterus    Right thyroid nodule    Pure hypercholesterolemia    Eczema    Type 2 diabetes mellitus without complication, with long-term current use of insulin (HCC)    Special screening for malignant neoplasm of colon      Past Surgical History:   Procedure Laterality Date    Daphne biopsy stereo nodule 1 site right (cpt=19081) Right 05/2019    benign      Social History     Socioeconomic History    Marital status:    Tobacco Use    Smoking status: Never    Smokeless tobacco: Never   Vaping Use    Vaping status: Never Used   Substance and Sexual Activity    Alcohol use: No    Drug use: No    Sexual activity: Yes     Partners: Male     Birth control/protection: Condom   Other Topics Concern    Caffeine Concern Yes     Comment: 1 cup of coffee x day    Exercise No    Seat Belt Yes         OBJECTIVE:   /74   Pulse 79    Temp 96.7 °F (35.9 °C) (Temporal)   Resp 18   Ht 5' 3.39\" (1.61 m)   Wt 139 lb 12.8 oz (63.4 kg)   LMP 11/29/2024 (Approximate)   SpO2 98%   BMI 24.46 kg/m²   Constitutional: Oriented to person, place, and time. No distress.   HEENT:  Normocephalic and atraumatic  Cardiovascular: Normal rate, regular rhythm and intact distal pulses.  No murmur, rubs or gallops.   Pulmonary/Chest: Effort normal and breath sounds normal. No respiratory distress.  Abdominal: Soft, nontender, and nondistended.  Musculoskeletal: No edema  Skin: Skin is warm and dry. No rash.  Psychiatric: Normal mood and affect.     ASSESSMENT AND PLAN:   Bogdan Solares is a 49 year old female who presents as a follow-up.    Outstanding screening and preventive measures:  Screening for cervical cancer: Advised to complete    DM2:  Hemoglobin A1c of 7.9%.  Discontinue Ozempic in light of recent thyroid atypia.  Initiate pioglitazone as patient's preference is more for a traditional antihyperglycemic regimen.  Continue metformin 1 g twice daily, glipizide extended release 10 mg daily, and 7030 26 units twice daily.  Blood glucose log in 1 week, at which time further recommendations for changes with insulin regimen will be provided.    Pure hypercholesterolemia:  LDL at goal  Continue atorvastatin 10 mg daily    Right thyroid nodule:  Atypia of undetermined significance  Awaiting follow-up evaluation by ENT service    Normocytic anemia:  CBC ordered    The patient indicates understanding of these issues and agrees to the plan.    TODAY'S ORDERS     No orders of the defined types were placed in this encounter.      Meds & Refills:  Requested Prescriptions     Signed Prescriptions Disp Refills    pioglitazone 30 MG Oral Tab 90 tablet 0     Sig: Take 1 tablet (30 mg total) by mouth daily.       Imaging & Consults:  None    No follow-ups on file.  There are no Patient Instructions on file for this visit.    All questions were answered and the patient  agrees with the plan.     Thank you,  Obie Booth MD       [1]   Allergies  Allergen Reactions    Ace Inhibitors

## 2024-12-23 ENCOUNTER — TELEPHONE (OUTPATIENT)
Facility: LOCATION | Age: 49
End: 2024-12-23

## 2024-12-23 NOTE — TELEPHONE ENCOUNTER
Called patient to discuss Afirma genetic testing.  Risk of malignancy noted as 50%.  I briefly discussed that we would recommend hemithyroidectomy.  She will schedule an appointment to further discuss surgery and undergo laryngoscopy.  All questions answered.    Malgorzata Alvarez MD, CHANI  Facial Plastic & Reconstructive Surgery, Otolaryngology  [facility]

## 2024-12-30 ENCOUNTER — OFFICE VISIT (OUTPATIENT)
Facility: LOCATION | Age: 49
End: 2024-12-30
Payer: COMMERCIAL

## 2024-12-30 DIAGNOSIS — E04.1 THYROID NODULE: Primary | ICD-10-CM

## 2024-12-30 PROCEDURE — 99214 OFFICE O/P EST MOD 30 MIN: CPT | Performed by: OTOLARYNGOLOGY

## 2024-12-30 NOTE — PROGRESS NOTES
Bogdan Solares is a 49 year old female. No chief complaint on file.    HPI:   She has a history of right sided thyroid nodule.  She underwent biopsy.  There is no family history of thyroid cancer.  She is not on thyroid medication.    REVIEW OF SYSTEMS:   GENERAL HEALTH: feels well otherwise  GENERAL : denies fever, chills, sweats, weight loss, weight gain  SKIN: denies any unusual skin lesions or rashes  RESPIRATORY: denies shortness of breath with exertion  NEURO: denies headaches    EXAM:   LMP 11/29/2024 (Approximate)     System Findings Details   Constitutional  Overall appearance - Normal.   Psychiatric  Orientation - Oriented to time, place, person & situation. Appropriate mood and affect.   Head/Face  Facial features -- Normal. Skull - Normal.   Eyes  Pupils equal ,round ,react to light and accomidate   Ears, Nose, Throat, Neck  Thyroid feels normal in size I do not palpate any masses or adenopathy.   Neurological  Memory - Normal. Cranial nerves - Cranial nerves II through XII grossly intact.   Lymph Detail  Submental. Submandibular. Anterior cervical. Posterior cervical. Supraclavicular.       ASSESSMENT AND PLAN:   1. Thyroid nodule  Thyroid ultrasound shows a 1.3 cm calcified right mid thyroid nodule.  Biopsy was consistent with cellular atypia.  Afirma testing suggests a 50% chance of malignancy.  Given the above we will proceed with a right thyroid lobectomy.  We have discussed this procedure in detail including the risk benefits alternatives.  The risks are to include not limited to bleeding infection recurrent laryngeal nerve injury hoarseness and hypoparathyroidism.  She understands that there is a slight possibility of cancer is found depending on the type of cancer she could need to undergo completion thyroidectomy.      The patient indicates understanding of these issues and agrees to the plan.    No follow-ups on file.    Dilip Bernal MD  12/30/2024  2:00 PM

## 2025-01-02 DIAGNOSIS — E04.1 NONTOXIC UNINODULAR GOITER: Primary | ICD-10-CM

## 2025-01-06 NOTE — H&P
[facility]  History & Physical    Bogdan Solares Patient Status:  Outpatient in a Bed    1975 MRN [id number]   Location [facility] SURGERY Attending Dilip Bernal MD   Hosp Day # 0 PCP Obie Booth MD     History of Present Illness:  Bogdan Solares is a(n) 49 year old female.  Patient with a right-sided nodule.    History:  Past Medical History:    Anemia    Uterine Fibroids    Diabetes mellitus (HCC)    Dyslipidemia    Fibrocystic breast changes    Heavy menses    High cholesterol    Long QT syndrome    Microalbuminuria    Type II or unspecified type diabetes mellitus without mention of complication, not stated as uncontrolled    Unspecified sinusitis (chronic)    Wears glasses     Past Surgical History:   Procedure Laterality Date    Daphne biopsy stereo nodule 1 site right (cpt=19081) Right 2019    benign     Family History   Problem Relation Age of Onset    Diabetes Father         DM    Hypertension Father     Other (CAD) Father     Diabetes Mother         DM    Hypertension Mother       reports that she has never smoked. She has never used smokeless tobacco. She reports that she does not drink alcohol and does not use drugs.    Allergies:  Allergies[1]    Home Medications:  Prescriptions Prior to Admission[2]    Physical Exam:   General: Alert, orientated x3.  Cooperative.  No apparent distress.  Vital Signs:  LMP 2024 (Approximate)   HEENT normal-sized thyroid  Neck: No tenderness to palpitation.  Full range of motion to flexion and extension, lateral rotation and lateral flexion of cervical spine.  No JVD. Supple.   Lungs: Clear to auscultation bilaterally.  Cardiac: Regular rate and rhythm. No murmur.  Abdomen:  Soft, non-distended, non-tender, with no rebound or guarding.  No peritoneal signs. No ascites.  Liver is within normal limits.  Spleen is not palpable.    Extremities:  No lower extremity edema noted.  Without clubbing or cyanosis.    Skin: Normal texture and  turgor.  Lymphatic:  No palpable cervical lymphadenopathy.  Neurologic: Cranial nerves are grossly intact.  Motor strength and sensory examination is grossly normal.  No focal neurologic deficit.    Laboratory Data:      Impression and Plan:  Patient Active Problem List   Diagnosis    Iron deficiency anemia    Fibroid uterus    Right thyroid nodule    Pure hypercholesterolemia    Eczema    Type 2 diabetes mellitus without complication, with long-term current use of insulin (HCC)    Special screening for malignant neoplasm of colon     Thyroid ultrasound shows a 1.3 cm calcified right mid thyroid nodule.  Biopsy is consistent with cellular atypia.  Afirma testing suggests a 50% chance of malignancy.    Right thyroid nodule    Right thyroid lobectomy with isthmusectomy.        Dilip Bernal MD  1/6/2025  8:21 AM         [1]   Allergies  Allergen Reactions    Ace Inhibitors    [2]   No medications prior to admission.

## 2025-01-09 ENCOUNTER — EKG ENCOUNTER (OUTPATIENT)
Dept: LAB | Age: 50
End: 2025-01-09
Attending: OTOLARYNGOLOGY
Payer: COMMERCIAL

## 2025-01-09 DIAGNOSIS — Z01.818 ENCOUNTER FOR PREADMISSION TESTING: ICD-10-CM

## 2025-01-09 DIAGNOSIS — E04.1 THYROID NODULE: ICD-10-CM

## 2025-01-09 LAB
ATRIAL RATE: 73 BPM
P AXIS: 46 DEGREES
P-R INTERVAL: 140 MS
Q-T INTERVAL: 478 MS
QRS DURATION: 82 MS
QTC CALCULATION (BEZET): 526 MS
R AXIS: 44 DEGREES
T AXIS: 83 DEGREES
T4 FREE SERPL-MCNC: 1.5 NG/DL (ref 0.8–1.7)
TSI SER-ACNC: 2.17 UIU/ML (ref 0.55–4.78)
VENTRICULAR RATE: 73 BPM

## 2025-01-09 PROCEDURE — 84443 ASSAY THYROID STIM HORMONE: CPT

## 2025-01-09 PROCEDURE — 93005 ELECTROCARDIOGRAM TRACING: CPT

## 2025-01-09 PROCEDURE — 93010 ELECTROCARDIOGRAM REPORT: CPT | Performed by: INTERNAL MEDICINE

## 2025-01-09 PROCEDURE — 36415 COLL VENOUS BLD VENIPUNCTURE: CPT

## 2025-01-09 PROCEDURE — 84439 ASSAY OF FREE THYROXINE: CPT

## 2025-01-13 ENCOUNTER — ANESTHESIA EVENT (OUTPATIENT)
Dept: SURGERY | Facility: HOSPITAL | Age: 50
End: 2025-01-13
Payer: COMMERCIAL

## 2025-01-13 NOTE — ANESTHESIA PREPROCEDURE EVALUATION
PRE-OP EVALUATION    Patient Name: Bogdan Solares    Admit Diagnosis: Nontoxic uninodular goiter [E04.1]    Pre-op Diagnosis: Nontoxic uninodular goiter [E04.1]    Right Unilateral Total Thyroid Lobectomy, With or Without Isthmusectomy, Right Side    Anesthesia Procedure: Right Unilateral Total Thyroid Lobectomy, With or Without Isthmusectomy, Right Side (Right)    Surgeons and Role:     * Dilip Bernal MD - Primary    Pre-op vitals reviewed.        Body mass index is 24.27 kg/m².    Current medications reviewed.  Hospital Medications:  No current facility-administered medications on file as of .       Outpatient Medications:   Prescriptions Prior to Admission[1]    Allergies: Ace inhibitors      Anesthesia Evaluation    Patient summary reviewed.    Anesthetic Complications  (-) history of anesthetic complications         GI/Hepatic/Renal                                 Cardiovascular                  (+) hypertension   (+) hyperlipidemia                                  Endo/Other      (+) diabetes  type 2, using insulin      (+) anemia                   Pulmonary                           Neuro/Psych                              Eczema Fibroid uterus  Iron deficiency anemia Pure hypercholesterolemia  Right thyroid nodule Special screening for malignant neoplasm of colon  Type 2 diabetes mellitus without complication, with long-term current use of insulin (HCC)             Past Surgical History:   Procedure Laterality Date    Daphne biopsy stereo nodule 1 site right (cpt=19081) Right 05/2019    benign     Social History     Socioeconomic History    Marital status:    Tobacco Use    Smoking status: Never    Smokeless tobacco: Never   Vaping Use    Vaping status: Never Used   Substance and Sexual Activity    Alcohol use: Not Currently     Comment: rarely    Drug use: No    Sexual activity: Yes     Partners: Male     Birth control/protection: Condom   Other Topics Concern    Caffeine Concern Yes     Comment:  1 cup of coffee x day    Exercise No    Seat Belt Yes     History   Drug Use No     Available pre-op labs reviewed.     Lab Results   Component Value Date     11/26/2024    K 3.8 11/26/2024     11/26/2024    CO2 27.0 11/26/2024    BUN 12 11/26/2024    CREATSERUM 0.49 (L) 11/26/2024    GLU 84 11/26/2024    CA 9.3 11/26/2024            Airway      Mallampati: I  Mouth opening: >3 FB  TM distance: > 6 cm  Neck ROM: full Cardiovascular    Cardiovascular exam normal.         Dental    Dentition appears grossly intact         Pulmonary    Pulmonary exam normal.                 Other findings              ASA: 3   Plan: general  NPO status verified and patient meets guidelines.          Plan/risks discussed with: patient                Present on Admission:  **None**             [1]   No medications prior to admission.

## 2025-01-14 ENCOUNTER — HOSPITAL ENCOUNTER (OUTPATIENT)
Facility: HOSPITAL | Age: 50
Discharge: HOME | End: 2025-01-15
Attending: OTOLARYNGOLOGY | Admitting: OTOLARYNGOLOGY
Payer: COMMERCIAL

## 2025-01-14 ENCOUNTER — ANESTHESIA (OUTPATIENT)
Dept: SURGERY | Facility: HOSPITAL | Age: 50
End: 2025-01-14
Payer: COMMERCIAL

## 2025-01-14 DIAGNOSIS — Z01.818 ENCOUNTER FOR PREADMISSION TESTING: Primary | ICD-10-CM

## 2025-01-14 DIAGNOSIS — E04.1 NONTOXIC UNINODULAR GOITER: ICD-10-CM

## 2025-01-14 LAB
B-HCG UR QL: NEGATIVE
GLUCOSE BLD-MCNC: 181 MG/DL (ref 70–99)
GLUCOSE BLD-MCNC: 217 MG/DL (ref 70–99)
GLUCOSE BLD-MCNC: 252 MG/DL (ref 70–99)
GLUCOSE BLD-MCNC: 254 MG/DL (ref 70–99)

## 2025-01-14 PROCEDURE — 0GTH0ZZ RESECTION OF RIGHT THYROID GLAND LOBE, OPEN APPROACH: ICD-10-PCS | Performed by: OTOLARYNGOLOGY

## 2025-01-14 PROCEDURE — 60220 PARTIAL REMOVAL OF THYROID: CPT | Performed by: OTOLARYNGOLOGY

## 2025-01-14 PROCEDURE — 0GTJ0ZZ RESECTION OF THYROID GLAND ISTHMUS, OPEN APPROACH: ICD-10-PCS | Performed by: OTOLARYNGOLOGY

## 2025-01-14 PROCEDURE — 99204 OFFICE O/P NEW MOD 45 MIN: CPT | Performed by: HOSPITALIST

## 2025-01-14 RX ORDER — NICOTINE POLACRILEX 4 MG
30 LOZENGE BUCCAL
Status: DISCONTINUED | OUTPATIENT
Start: 2025-01-14 | End: 2025-01-14 | Stop reason: HOSPADM

## 2025-01-14 RX ORDER — INSULIN DEGLUDEC 100 U/ML
15 INJECTION, SOLUTION SUBCUTANEOUS NIGHTLY
Status: DISCONTINUED | OUTPATIENT
Start: 2025-01-15 | End: 2025-01-15

## 2025-01-14 RX ORDER — NICOTINE POLACRILEX 4 MG
15 LOZENGE BUCCAL
Status: DISCONTINUED | OUTPATIENT
Start: 2025-01-14 | End: 2025-01-15

## 2025-01-14 RX ORDER — CALCIUM CARBONATE 500(1250)
1000 TABLET ORAL ONCE
Status: DISCONTINUED | OUTPATIENT
Start: 2025-01-14 | End: 2025-01-14 | Stop reason: HOSPADM

## 2025-01-14 RX ORDER — SODIUM CHLORIDE, SODIUM LACTATE, POTASSIUM CHLORIDE, CALCIUM CHLORIDE 600; 310; 30; 20 MG/100ML; MG/100ML; MG/100ML; MG/100ML
INJECTION, SOLUTION INTRAVENOUS CONTINUOUS
Status: DISCONTINUED | OUTPATIENT
Start: 2025-01-14 | End: 2025-01-14

## 2025-01-14 RX ORDER — ONDANSETRON 2 MG/ML
4 INJECTION INTRAMUSCULAR; INTRAVENOUS EVERY 6 HOURS PRN
Status: DISCONTINUED | OUTPATIENT
Start: 2025-01-14 | End: 2025-01-14 | Stop reason: SINTOL

## 2025-01-14 RX ORDER — NICOTINE POLACRILEX 4 MG
15 LOZENGE BUCCAL
Status: DISCONTINUED | OUTPATIENT
Start: 2025-01-14 | End: 2025-01-14 | Stop reason: HOSPADM

## 2025-01-14 RX ORDER — METOCLOPRAMIDE HYDROCHLORIDE 5 MG/ML
10 INJECTION INTRAMUSCULAR; INTRAVENOUS EVERY 6 HOURS PRN
Status: DISCONTINUED | OUTPATIENT
Start: 2025-01-14 | End: 2025-01-15

## 2025-01-14 RX ORDER — OXYCODONE HYDROCHLORIDE 10 MG/1
10 TABLET ORAL EVERY 4 HOURS PRN
Status: DISCONTINUED | OUTPATIENT
Start: 2025-01-14 | End: 2025-01-15

## 2025-01-14 RX ORDER — DEXAMETHASONE SODIUM PHOSPHATE 4 MG/ML
VIAL (ML) INJECTION AS NEEDED
Status: DISCONTINUED | OUTPATIENT
Start: 2025-01-14 | End: 2025-01-14 | Stop reason: SURG

## 2025-01-14 RX ORDER — SCOPOLAMINE 1 MG/3D
1 PATCH, EXTENDED RELEASE TRANSDERMAL ONCE
Status: DISCONTINUED | OUTPATIENT
Start: 2025-01-14 | End: 2025-01-14 | Stop reason: HOSPADM

## 2025-01-14 RX ORDER — CALCIUM CARBONATE 500 MG/1
1500 TABLET, CHEWABLE ORAL
Status: DISCONTINUED | OUTPATIENT
Start: 2025-01-14 | End: 2025-01-15

## 2025-01-14 RX ORDER — NICOTINE POLACRILEX 4 MG
30 LOZENGE BUCCAL
Status: DISCONTINUED | OUTPATIENT
Start: 2025-01-14 | End: 2025-01-15

## 2025-01-14 RX ORDER — HYDROMORPHONE HYDROCHLORIDE 1 MG/ML
0.2 INJECTION, SOLUTION INTRAMUSCULAR; INTRAVENOUS; SUBCUTANEOUS EVERY 5 MIN PRN
Status: DISCONTINUED | OUTPATIENT
Start: 2025-01-14 | End: 2025-01-14 | Stop reason: HOSPADM

## 2025-01-14 RX ORDER — INSULIN DEGLUDEC 100 U/ML
15 INJECTION, SOLUTION SUBCUTANEOUS DAILY
Status: DISCONTINUED | OUTPATIENT
Start: 2025-01-14 | End: 2025-01-14

## 2025-01-14 RX ORDER — ATORVASTATIN CALCIUM 10 MG/1
10 TABLET, FILM COATED ORAL NIGHTLY
Status: DISCONTINUED | OUTPATIENT
Start: 2025-01-14 | End: 2025-01-15

## 2025-01-14 RX ORDER — ACETAMINOPHEN 500 MG
1000 TABLET ORAL ONCE
Status: DISCONTINUED | OUTPATIENT
Start: 2025-01-14 | End: 2025-01-14 | Stop reason: HOSPADM

## 2025-01-14 RX ORDER — ACETAMINOPHEN 325 MG/1
650 TABLET ORAL
Status: DISCONTINUED | OUTPATIENT
Start: 2025-01-14 | End: 2025-01-15

## 2025-01-14 RX ORDER — HYDROMORPHONE HYDROCHLORIDE 1 MG/ML
0.4 INJECTION, SOLUTION INTRAMUSCULAR; INTRAVENOUS; SUBCUTANEOUS EVERY 2 HOUR PRN
Status: DISCONTINUED | OUTPATIENT
Start: 2025-01-14 | End: 2025-01-15

## 2025-01-14 RX ORDER — INSULIN ASPART 100 [IU]/ML
INJECTION, SOLUTION INTRAVENOUS; SUBCUTANEOUS ONCE
Status: COMPLETED | OUTPATIENT
Start: 2025-01-14 | End: 2025-01-14

## 2025-01-14 RX ORDER — ONDANSETRON 2 MG/ML
INJECTION INTRAMUSCULAR; INTRAVENOUS AS NEEDED
Status: DISCONTINUED | OUTPATIENT
Start: 2025-01-14 | End: 2025-01-14 | Stop reason: SURG

## 2025-01-14 RX ORDER — PIOGLITAZONE 30 MG/1
30 TABLET ORAL DAILY
COMMUNITY

## 2025-01-14 RX ORDER — PIOGLITAZONE 15 MG/1
30 TABLET ORAL DAILY
Status: DISCONTINUED | OUTPATIENT
Start: 2025-01-14 | End: 2025-01-14

## 2025-01-14 RX ORDER — INSULIN ASPART 100 [IU]/ML
INJECTION, SOLUTION INTRAVENOUS; SUBCUTANEOUS
Status: COMPLETED
Start: 2025-01-14 | End: 2025-01-14

## 2025-01-14 RX ORDER — CALCITRIOL 0.25 UG/1
CAPSULE, LIQUID FILLED ORAL
Status: COMPLETED
Start: 2025-01-14 | End: 2025-01-14

## 2025-01-14 RX ORDER — CALCITRIOL 0.25 UG/1
0.25 CAPSULE, LIQUID FILLED ORAL ONCE
Status: COMPLETED | OUTPATIENT
Start: 2025-01-14 | End: 2025-01-14

## 2025-01-14 RX ORDER — OXYCODONE HYDROCHLORIDE 5 MG/1
5 TABLET ORAL EVERY 4 HOURS PRN
Status: DISCONTINUED | OUTPATIENT
Start: 2025-01-14 | End: 2025-01-15

## 2025-01-14 RX ORDER — DEXTROSE MONOHYDRATE 25 G/50ML
50 INJECTION, SOLUTION INTRAVENOUS
Status: DISCONTINUED | OUTPATIENT
Start: 2025-01-14 | End: 2025-01-15

## 2025-01-14 RX ORDER — DEXTROSE MONOHYDRATE 25 G/50ML
50 INJECTION, SOLUTION INTRAVENOUS
Status: DISCONTINUED | OUTPATIENT
Start: 2025-01-14 | End: 2025-01-14 | Stop reason: HOSPADM

## 2025-01-14 RX ORDER — HYDROMORPHONE HYDROCHLORIDE 1 MG/ML
0.6 INJECTION, SOLUTION INTRAMUSCULAR; INTRAVENOUS; SUBCUTANEOUS EVERY 5 MIN PRN
Status: DISCONTINUED | OUTPATIENT
Start: 2025-01-14 | End: 2025-01-14 | Stop reason: HOSPADM

## 2025-01-14 RX ORDER — LOSARTAN POTASSIUM 25 MG/1
25 TABLET ORAL DAILY
Status: DISCONTINUED | OUTPATIENT
Start: 2025-01-14 | End: 2025-01-15

## 2025-01-14 RX ORDER — SODIUM CHLORIDE AND POTASSIUM CHLORIDE 150; 900 MG/100ML; MG/100ML
INJECTION, SOLUTION INTRAVENOUS CONTINUOUS
Status: DISCONTINUED | OUTPATIENT
Start: 2025-01-14 | End: 2025-01-15

## 2025-01-14 RX ORDER — CALCIUM CARBONATE 500(1250)
TABLET ORAL
Status: COMPLETED
Start: 2025-01-14 | End: 2025-01-14

## 2025-01-14 RX ORDER — HYDROMORPHONE HYDROCHLORIDE 1 MG/ML
0.4 INJECTION, SOLUTION INTRAMUSCULAR; INTRAVENOUS; SUBCUTANEOUS EVERY 5 MIN PRN
Status: DISCONTINUED | OUTPATIENT
Start: 2025-01-14 | End: 2025-01-14 | Stop reason: HOSPADM

## 2025-01-14 RX ORDER — SODIUM CHLORIDE, SODIUM LACTATE, POTASSIUM CHLORIDE, CALCIUM CHLORIDE 600; 310; 30; 20 MG/100ML; MG/100ML; MG/100ML; MG/100ML
INJECTION, SOLUTION INTRAVENOUS CONTINUOUS
Status: DISCONTINUED | OUTPATIENT
Start: 2025-01-14 | End: 2025-01-14 | Stop reason: HOSPADM

## 2025-01-14 RX ORDER — HYDROMORPHONE HYDROCHLORIDE 1 MG/ML
0.8 INJECTION, SOLUTION INTRAMUSCULAR; INTRAVENOUS; SUBCUTANEOUS EVERY 2 HOUR PRN
Status: DISCONTINUED | OUTPATIENT
Start: 2025-01-14 | End: 2025-01-15

## 2025-01-14 RX ORDER — LIDOCAINE HYDROCHLORIDE AND EPINEPHRINE 10; 10 MG/ML; UG/ML
INJECTION, SOLUTION INFILTRATION; PERINEURAL AS NEEDED
Status: DISCONTINUED | OUTPATIENT
Start: 2025-01-14 | End: 2025-01-14 | Stop reason: HOSPADM

## 2025-01-14 RX ADMIN — ONDANSETRON 4 MG: 2 INJECTION INTRAMUSCULAR; INTRAVENOUS at 10:09:00

## 2025-01-14 RX ADMIN — DEXAMETHASONE SODIUM PHOSPHATE 8 MG: 4 MG/ML VIAL (ML) INJECTION at 08:45:00

## 2025-01-14 RX ADMIN — SODIUM CHLORIDE, SODIUM LACTATE, POTASSIUM CHLORIDE, CALCIUM CHLORIDE: 600; 310; 30; 20 INJECTION, SOLUTION INTRAVENOUS at 10:24:00

## 2025-01-14 NOTE — ANESTHESIA PROCEDURE NOTES
Airway  Date/Time: 1/14/2025 8:47 AM  Urgency: elective      General Information and Staff    Patient location during procedure: OR  Anesthesiologist: Miles Cool MD  Performed: anesthesiologist   Performed by: Miles Cool MD  Authorized by: Miles Cool MD      Indications and Patient Condition  Indications for airway management: anesthesia  Sedation level: deep  Preoxygenated: yes  Patient position: sniffing  Mask difficulty assessment: 1 - vent by mask    Final Airway Details  Final airway type: endotracheal airway      Successful airway: ETT and NIM tube  Cuffed: yes   Successful intubation technique: Video laryngoscopy  Endotracheal tube insertion site: oral  Blade: GlideScope  Blade size: #3  ETT size (mm): 7.0    Cormack-Lehane Classification: grade I - full view of glottis  Placement verified by: capnometry   Measured from: lips  Number of attempts at approach: 1

## 2025-01-14 NOTE — OPERATIVE REPORT
[facility]  Op Note    Bogdan Solares Location: OR   CSN [id number] MRN [id number]   Admission Date 1/14/2025 Operation Date 1/14/2025   Attending Physician Dilip Bernal MD Operating Physician Dilip Bernal MD     Pre-Operative Diagnosis: Nontoxic uninodular goiter [E04.1]    Post-Operative Diagnosis: Same as above    Procedure Performed: Procedure(s):  Right Thyroid Lobectomy, With Isthmusectomy    Surgeon: Surgeon(s):  Dilip Bernal MD     Anesthesia: General        Summary of Case: After satisfactory general endotracheal anesthesia induction the patient was prepared for the procedure.  A shoulder roll was placed.  1% lidocaine with epinephrine was injected at the anterior neck.  There was then prepped and then draped usual sterile fashion.  The recurrent laryngeal nerve was monitored throughout the case.    15 blade was used to make a horizontal incision at the midline.  This was carried down through the platysma muscle.  Subplatysmal flaps were raised both superiorly and inferiorly.  Self-retaining retractors placed.  Dissection was carried out midline between the strap musculature.  The strap muscles were carefully elevated off the right lobe of the thyroid gland.  Patient was noted to have an indurated right thyroid gland with a indurated nodule.  The middle thyroid vein was clamped and tied off with silk ties.  There was numerous vessels coming into the gland over the trachea which were clamped and tied off with silk ties.  Smaller vessels were sealed with the ligature.  We turned our attention laterally.  We identified the recurrent laryngeal nerve.  Patient noted to have a small thin nerve.  Care was taken to keep this nerve intact.  He was stimulated and kept intact throughout the case.  I turned my attention superiorly.  There is numerous superior pole vessels coming to the gland which were clamped and tied off with silk ties.  We then identified the parathyroid glands.  They are  carefully teased free of the thyroid capsule taking care to keep the blood intact.  The thyroid was then rolled from lateral to medial coming across Mccarty's ligament.  The thyroid was then divided at the isthmus with the ligature.  The right lobe of the thyroid was then sent to pathology for analysis.    The left side of the thyroid was palpated inspected and noted to be normal.  There is no pathologic lymph nodes.  The wound was copiously irrigated out with saline.  There is no further signs of bleeding.  The nerve was stimulated in the case noted be intact.  The parathyroids were good color.  Surgicel powder was placed into the wound.  The strap muscles were closed with Vicryl suture.  The deep layers were closed with Vicryl suture.  The skin was closed with a running subcuticular stitch.  Steri-Strips and sterile dressing was applied.  Patient was then awoken extubated shows recovery room in stable condition.        Dilip Bernal MD  1/14/2025  10:11 AM

## 2025-01-14 NOTE — ANESTHESIA POSTPROCEDURE EVALUATION
[facility]    Bogdan Solares Patient Status:  Outpatient in a Bed   Age/Gender 49 year old female MRN [id number]   Location [facility] SURGERY Attending Dilip Bernal MD   Hosp Day # 0 PCP Obie Booth MD       Anesthesia Post-op Note    Right Thyroid Lobectomy, With Isthmusectomy    Procedure Summary       Date: 01/14/25 Room / Location:  MAIN OR 02 /  MAIN OR    Anesthesia Start: 0842 Anesthesia Stop: 1024    Procedure: Right Thyroid Lobectomy, With Isthmusectomy (Right: Throat) Diagnosis:       Nontoxic uninodular goiter      (Nontoxic uninodular goiter [E04.1])    Surgeons: Dilip Bernal MD Anesthesiologist: Miles Cool MD    Anesthesia Type: general ASA Status: 3            Anesthesia Type: general    Vitals Value Taken Time   /74 01/14/25 1024   Temp 97.1 °F (36.2 °C) 01/14/25 1024   Pulse 80 01/14/25 1024   Resp 16 01/14/25 1024   SpO2 91 % 01/14/25 1024           Patient Location: PACU    Anesthesia Type: general    Airway Patency: patent and extubated    Postop Pain Control: adequate    Mental Status: mildly sedated but able to meaningfully participate in the post-anesthesia evaluation    Nausea/Vomiting: none    Cardiopulmonary/Hydration status: stable euvolemic    Complications: no apparent anesthesia related complications    Postop vital signs: stable    Dental Exam: Unchanged from Preop    Patient to be discharged from PACU when criteria met.

## 2025-01-14 NOTE — PLAN OF CARE
Pt here from: Home with spouse  Neuro: A&Ox4, VSS, RA, , IS. Denies cough, chest pain, and SOB.   GI: Abdomen soft, passing gas and belching. Denies nausea. Last BM was 1/14.   : Voids freely in bathroom.   Pain controlled with meds per MAR.   Up ad gillian.   Incisions: Anterior neck incision with gauze and tegaderm is CDI.   Diet: Painful to swallow, sticking with clears for now.   IVF running per order through PIV L hand.   All appropriate safety measures in place. All questions and concerns addressed. Bed locked and in lowest position. Call light in reach.

## 2025-01-14 NOTE — CONSULTS
[facility]  CONSULT     Bogdan Solares Patient Status:  Outpatient in a Bed    1975 MRN [id number]   Location [facility] 3NW-A Attending Dilip Bernal MD   Hosp Day # 0 PCP Obie Booth MD     Reason for consult: med mgmt, DM    Requested by: dr bernal    Subjective:   History of Present Illness:     Bogdan Solares is a 49 year old female with medical history DM2, uterine fibroids, hyperlipidemia, hypertension, long QT syndrome who presents with goiter.  Patient has been getting out patient workup for a 1.3 cm calcified right mid thyroid nodule.  Biopsy was done and is consistent with cellular atypia suggesting a 50% chance of malignancy.  Presented today for right thyroid lobectomy with isthmusectomy.  Seen postop and tolerated well.  Pain is controlled.    History/Other:    Past Medical History:  Past Medical History:    Anemia    Uterine Fibroids    Diabetes mellitus (HCC)    Dyslipidemia    Fibrocystic breast changes    Heavy menses    High blood pressure    High cholesterol    Long QT syndrome    Microalbuminuria    Type II or unspecified type diabetes mellitus without mention of complication, not stated as uncontrolled    Unspecified sinusitis (chronic)    Visual impairment    glasses    Wears glasses     Past Surgical History:   Past Surgical History:   Procedure Laterality Date    Daphne biopsy stereo nodule 1 site right (cpt=19081) Right 2019    benign      Family History:   Family History   Problem Relation Age of Onset    Diabetes Father         DM    Hypertension Father     Other (CAD) Father     Diabetes Mother         DM    Hypertension Mother      Social History:    reports that she has never smoked. She has never used smokeless tobacco. She reports that she does not currently use alcohol. She reports that she does not use drugs.     Allergies: Allergies[1]    Medications:  Medications Ordered Prior to Encounter[2]    Review of Systems:   A comprehensive review of systems  was completed.    Pertinent positives and negatives noted in the HPI.    Objective:   Physical Exam:    /81 (BP Location: Right arm)   Pulse 82   Temp 98.6 °F (37 °C) (Oral)   Resp 18   Ht 5' 3\" (1.6 m)   Wt 143 lb (64.9 kg)   LMP 12/15/2024 (Approximate)   SpO2 98%   BMI 25.33 kg/m²   General: No acute distress, Alert  HEENT: Incisions bandaged  Respiratory: No rhonchi, no wheezes  Cardiovascular: S1, S2. Regular rate and rhythm  Abdomen: Soft, NT/ND, +BS  Neuro: No new focal deficits  Extremities: No edema      Results:    Labs:      Labs Last 24 Hours:  No results for input(s): \"WBC\", \"HGB\", \"MCV\", \"PLT\", \"BAND\", \"INR\" in the last 168 hours.    Invalid input(s): \"LYM#\", \"MONO#\", \"BASOS#\", \"EOSIN#\"    No results for input(s): \"GLU\", \"BUN\", \"CREATSERUM\", \"GFRAA\", \"GFRNAA\", \"CA\", \"ALB\", \"NA\", \"K\", \"CL\", \"CO2\", \"ALKPHO\", \"AST\", \"ALT\", \"BILT\", \"TP\" in the last 168 hours.    No results for input(s): \"PTP\", \"INR\" in the last 168 hours.    No results for input(s): \"TROP\", \"CK\" in the last 168 hours.      Imaging: Imaging data reviewed in Epic.    Assessment & Plan:      # Thyroid nodule with cellular atypia status post right thyroid lobectomy with isthmusectomy  -Per ENT  -Pain control    #DM2  -On 70/30+ p.o. pioglitazone, glipizide, metformin at home  -Hold p.o. meds  -Will give degludec now with carb coverage and correction factor  -if dc'ed tomorrow, resume 70/30 tomorrow night    #Hypertension  -Continue home meds    #Hyperlipidemia    #Long QT syndrome    #Uterine fibroids    Likely dc tomorrow. Monitor BS    Plan of care discussed with patient, ED physician    Hugh Dunlap MD  1/14/2025    The 21st Century Cures Act makes medical notes like these available to patients in the interest of transparency. Please be advised this is a medical document. Medical documents are intended to carry relevant information, facts as evident, and the clinical opinion of the practitioner. The medical note is intended  as peer to peer communication and may appear blunt or direct. It is written in medical language and may contain abbreviations or verbiage that are unfamiliar.          [1]   Allergies  Allergen Reactions    Ace Inhibitors Coughing   [2]   No current facility-administered medications on file prior to encounter.     Current Outpatient Medications on File Prior to Encounter   Medication Sig Dispense Refill    pioglitazone 30 MG Oral Tab Take 1 tablet (30 mg total) by mouth daily.      metFORMIN HCl 1000 MG Oral Tab Take 1 tablet (1,000 mg total) by mouth 2 (two) times daily with meals. 180 tablet 3    glipiZIDE ER 10 MG Oral Tablet 24 Hr Take 1 tablet (10 mg total) by mouth daily. 90 tablet 3    NovoLOG Mix 70/30 FlexPen 100 UNIT/ML Susp Pen-injector (Insulin Aspart Prot & Aspart 70/30) Inject 26 Units into the skin 2 (two) times daily. **Please complete labs for further refills. 45 mL 0    losartan 25 MG Oral Tab Take 1 tablet (25 mg total) by mouth daily. 90 tablet 3    atorvastatin 10 MG Oral Tab Take 1 tablet (10 mg total) by mouth nightly. 90 tablet 3    Ascorbic Acid (VITAMIN C) 1000 MG Oral Tab Take 1 tablet (1,000 mg total) by mouth daily.      Omega-3 1000 MG Oral Cap Take 1 capsule by mouth daily.      Cyanocobalamin (B-12) 500 MCG Oral Tab Take 1 tablet by mouth daily.      Cholecalciferol (VITAMIN D) 1000 UNITS Oral Tab Take 1 tablet by mouth daily.

## 2025-01-14 NOTE — PLAN OF CARE
Patient admitted via bed from PACU.   Oriented to room.   Safety precautions initiated.   Bed in low position.  Call light in reach.     Admission skin check done with FLORES Woodward. No skin issues, all skin intact other than anterior neck incision from surgery.

## 2025-01-15 VITALS
HEIGHT: 63 IN | TEMPERATURE: 98 F | RESPIRATION RATE: 17 BRPM | BODY MASS INDEX: 25.34 KG/M2 | SYSTOLIC BLOOD PRESSURE: 130 MMHG | DIASTOLIC BLOOD PRESSURE: 66 MMHG | OXYGEN SATURATION: 98 % | HEART RATE: 71 BPM | WEIGHT: 143 LBS

## 2025-01-15 PROBLEM — Z01.818 ENCOUNTER FOR PREADMISSION TESTING: Status: ACTIVE | Noted: 2024-09-20

## 2025-01-15 LAB
CALCIUM BLD-MCNC: 9.2 MG/DL (ref 8.7–10.6)
GLUCOSE BLD-MCNC: 217 MG/DL (ref 70–99)

## 2025-01-15 PROCEDURE — 99213 OFFICE O/P EST LOW 20 MIN: CPT | Performed by: HOSPITALIST

## 2025-01-15 RX ORDER — OXYCODONE HYDROCHLORIDE 5 MG/1
5 TABLET ORAL EVERY 4 HOURS PRN
Qty: 15 TABLET | Refills: 0 | Status: SHIPPED | OUTPATIENT
Start: 2025-01-15

## 2025-01-15 NOTE — PLAN OF CARE
Alert and oriented x4. Normotensive, afebrile. Denies tingling, numbness, cramping.  Up ad gillian.  Surgical site to anterior neck CDI  Pain managed with scheduled Tylenol, PRN throat lozenges  Medications crushed in applesauce, pain when swallowing  IV fluids per order  Regular diet, tolerating soft foods only due to pain but otherwise no difficulty swallowing  Denies chest pain, dyspnea, lightheadedness

## 2025-01-15 NOTE — PROGRESS NOTES
[facility]   part of [facility]     Hospitalist Progress Note     Bogdan Solares Patient Status:  Outpatient in a Bed    1975 MRN [id number]   [facility] 3NW-A Attending Dilip Bernal MD   Hosp Day # 0 PCP Obie Booth MD     Chief Complaint: right thyroid lobectomy with isthmusectomy     Subjective:     Patient denies abd pain nausea    Objective:    Review of Systems:   A comprehensive review of systems was completed; pertinent positive and negatives stated in subjective.    Vital signs:  Temp:  [97.1 °F (36.2 °C)-99 °F (37.2 °C)] 98.2 °F (36.8 °C)  Pulse:  [71-84] 71  Resp:  [15-25] 17  BP: ()/(54-94) 130/66  SpO2:  [90 %-100 %] 98 %    Physical Exam:    General: No acute distress  Respiratory: No wheezes, no rhonchi  Cardiovascular: S1, S2, regular rate and rhythm  Abdomen: Soft, Non-tender, non-distended, positive bowel sounds  Neuro: No new focal deficits.   Extremities: No edema      Diagnostic Data:    Labs:  No results for input(s): \"WBC\", \"HGB\", \"MCV\", \"PLT\", \"BAND\", \"INR\" in the last 168 hours.    Invalid input(s): \"LYM#\", \"MONO#\", \"BASOS#\", \"EOSIN#\"    Recent Labs   Lab 01/15/25  0612   CA 9.2       eGFR cannot be calculated (Patient's most recent lab result is older than the maximum 7 days allowed.).    No results for input(s): \"TROP\", \"TROPHS\", \"CK\" in the last 168 hours.    No results for input(s): \"PTP\", \"INR\" in the last 168 hours.               Microbiology    No results found for this visit on 25.      Imaging: Reviewed in Epic.    Medications:    atorvastatin  10 mg Oral Nightly    losartan  25 mg Oral Daily    acetaminophen  650 mg Oral Q4H While awake    calcium carbonate  1,500 mg Oral TID CC    insulin aspart  1-68 Units Subcutaneous TID CC    insulin aspart  1-10 Units Subcutaneous TID AC and HS    insulin degludec  15 Units Subcutaneous Nightly       Assessment & Plan:      # Thyroid nodule with cellular atypia status post right thyroid  lobectomy with isthmusectomy  -Per ENT  -Pain control     #DM2  -On 70/30+ p.o. pioglitazone, glipizide, metformin at home  -Hold p.o. meds  -Will give degludec now with carb coverage and correction factor  -if dc'ed , resume 70/30      #Hypertension  -Continue home meds     #Hyperlipidemia     #Long QT syndrome     #Uterine fibroids      Reyna Cardoza MD    Supplementary Documentation:     Quality:  DVT Mechanical Prophylaxis:        DVT Pharmacologic Prophylaxis   Medication   None                Code Status: Not on file  [place]: No urinary catheter in place  [place] Duration (in days):   Central line:    RAFAL: 1/15/2025    Discharge is dependent on: progress  At this point Ms. Solares is expected to be discharge to: home    The 21st Century Cures Act makes medical notes like these available to patients in the interest of transparency. Please be advised this is a medical document. Medical documents are intended to carry relevant information, facts as evident, and the clinical opinion of the practitioner. The medical note is intended as peer to peer communication and may appear blunt or direct. It is written in medical language and may contain abbreviations or verbiage that are unfamiliar.

## 2025-01-15 NOTE — PROGRESS NOTES
A&Ox4. VSS. RA. . Denies numbness and tingling   GI: Abdomen soft, nondistended. Passing gas.  Denies nausea.  : Voids.  Pain controlled with scheduled Tylenol   Up independently   Incisions: Neck incision w/ gauze and tegaderm- C/D/I   Diet: Tolerating regular diet, carb coverage insulin given per MAR   Saline locked   All appropriate safety measures in place. All questions and concerns addressed.

## 2025-01-15 NOTE — DISCHARGE INSTRUCTIONS
[address] [place]  [address]  [phone]    [facility]  Jose Alfredo Thomas MD, FACS  MD Miles Knight MD    THYROIDECTOMY INSTRUCTIONS    What to Expect in the Postoperative Period  You will spend the night in the hospital.  During this time the wound is monitored, as are your calcium levels in the blood.   Mild to moderate postoperative pain is normal.  This is easily controlled with medication.   A surgical drain in the wound is sometimes necessary to prevent accumulation of fluid under the wound.  This is removed before you leave the hospital.  Sore throat and discomfort with swallowing are due to general anesthesia and the surgery itself, and are short term only.  Voice hoarseness may be temporary or permanent, depending on the nature of your thyroid problem.  Usually the voice sounds normal within 1-2 weeks of surgery, but if not, please notify your surgeon.  Decreased blood calcium levels may manifest as numbness and tingling around the mouth & fingers, spontaneous twitching in the face or mouth, and muscle spasm and cramping.  Please notify your surgeon or go to the Emergency Room if this condition occurs. Treatment typically involves oral calcium supplements.   Bleeding is rare after thyroid surgery.  If this occurs, extensive swelling of the neck can occur and may need to be drained. Notify your surgeon or go the Emergency Room immediately.     Activity and Restrictions  You will need to rest (off work or school) for 1 week after surgery.   No vigorous activity, heavy lifting (greater than 20 pounds), bending or straining for 2 weeks.  No driving for 1 week.     Medication  You will be given a prescription for pain medication (do not drive or drink alcohol while taking this medication).  You may also be given calcium supplements to prevent low calcium levels in the blood.    Follow up  You will be seen in the office 1 week after surgery (call  for an appointment if you do not already have one.    You may be instructed to follow-up with your primary care physician or an endocrinologist if long-term use of thyroid hormone replacement is necessary.

## 2025-01-15 NOTE — PROGRESS NOTES
Discharge education provided. Patient and spouse verbalized understanding. IV removed. Patient declined wheelchair. Waiting on meds-to-beds

## 2025-01-15 NOTE — PROGRESS NOTES
[facility]  Progress Note    Bogdan Solares Patient Status:  Outpatient in a Bed    1975 MRN [id number]   Location [facility] 3NW-A Attending Dilip Bernal MD   Hosp Day # 0 PCP Obie Booth MD     Subjective:  Some sore throat   No numbness, tingling or cramping.    Objective/Physical Exam:  General: Alert, orientated x3.  Cooperative.  No apparent distress.  Vital Signs:  Blood pressure 95/54, pulse 74, temperature 97.9 °F (36.6 °C), temperature source Oral, resp. rate 17, height 5' 3\" (1.6 m), weight 143 lb (64.9 kg), last menstrual period 12/15/2024, SpO2 98%, not currently breastfeeding.  HEENT: Good voice.  Neck: Supple.  Minimal swelling.  Dressing dry and intact.    Labs:  Calcium 9.2    Assessment/Plan:  Patient Active Problem List   Diagnosis    Iron deficiency anemia    Fibroid uterus    Right thyroid nodule    Pure hypercholesterolemia    Eczema    Type 2 diabetes mellitus without complication, with long-term current use of insulin (HCC)    Special screening for malignant neoplasm of colon    Thyroid nodule       Stable    Home on prn calcium       Dilip Bernal MD  1/15/2025  7:37 AM

## 2025-01-16 RX ORDER — INSULIN ASPART 100 [IU]/ML
26 INJECTION, SUSPENSION SUBCUTANEOUS 2 TIMES DAILY
Refills: 0 | OUTPATIENT
Start: 2025-01-16

## 2025-01-16 RX ORDER — INSULIN ASPART 100 [IU]/ML
26 INJECTION, SUSPENSION SUBCUTANEOUS 2 TIMES DAILY
Qty: 60 ML | Refills: 1 | Status: SHIPPED | OUTPATIENT
Start: 2025-01-16

## 2025-01-16 NOTE — TELEPHONE ENCOUNTER
Please kindly review; protocol failed or medication has no protocol attached.      Hemoglobin A1C in 3 months   Component  Ref Range & Units 11/26/24  9:35 AM   HgbA1C  <5.7 % 7.9 High       Stable diabetes control with all other findings within normal limits.  Please schedule diabetes follow-up. Labs in 3 months.   Written by Obie Booth MD on 11/27/2024 8:02 AM CST   Seen by patient Bogdan Solares on 11/27/2024  3:07 PM     Recent Visits  Date Type Provider Dept   12/13/24 Office Visit Obie Booth MD [address]     Future Appointments  None    Requested Prescriptions   Pending Prescriptions Disp Refills    NovoLOG Mix 70/30 FlexPen 100 UNIT/ML Susp Pen-injector (Insulin Aspart Prot & Aspart 70/30) 45 mL 0     Sig: Inject 26 Units into the skin 2 (two) times daily. **Please complete labs for further refills.       Diabetes Medication Protocol Failed - 1/16/2025 10:45 AM        Failed - Last A1C < 7.5 and within past 6 months     Lab Results   Component Value Date    A1C 7.9 (H) 11/26/2024             Passed - In person appointment or virtual visit in the past 6 mos or appointment in next 3 mos     Recent Outpatient Visits              2 weeks ago Thyroid nodule    [facility] [place] Rosy Pillai Rodney T, MD    Office Visit    1 month ago Type 2 diabetes mellitus without complication, with long-term current use of insulin (HCC)    [address]    Office Visit    1 month ago Thyroid nodule    [facility] [place] Rosy Pillai Paris, MD    Office Visit    5 months ago Routine general medical examination at a health care facility    [address]    Office Visit    1 year ago Routine general medical examination at a health care facility    [address]    Office  Visit                      Passed - Microalbumin procedure in past 12 months or taking ACE/ARB        Passed - EGFRCR or GFRNAA > 50     GFR Evaluation  EGFRCR: 117 , resulted on 11/26/2024          Passed - GFR in the past 12 months        Passed - Medication is active on med list               Recent Outpatient Visits              2 weeks ago Thyroid nodule    [facility] Rosy Pillai Rodney T, MD    Office Visit    1 month ago Type 2 diabetes mellitus without complication, with long-term current use of insulin ([facility])    [facility], [address] [place], [place] [address]Rosy Amish, MD    Office Visit    1 month ago Thyroid nodule    [facility], [place], Malgorzata Jones MD    Office Visit    5 months ago Routine general medical examination at a health care facility    [facility], [address] [place], [place] [address]Rosy Amish, MD    Office Visit    1 year ago Routine general medical examination at a health care facility    [facility], [address] [place], [place] [address]Rosy Amish, MD    Office Visit

## 2025-01-21 ENCOUNTER — MED REC SCAN ONLY (OUTPATIENT)
Facility: LOCATION | Age: 50
End: 2025-01-21

## 2025-01-21 ENCOUNTER — OFFICE VISIT (OUTPATIENT)
Facility: LOCATION | Age: 50
End: 2025-01-21
Payer: COMMERCIAL

## 2025-01-21 DIAGNOSIS — E04.1 THYROID NODULE: Primary | ICD-10-CM

## 2025-01-21 PROCEDURE — 99024 POSTOP FOLLOW-UP VISIT: CPT | Performed by: OTOLARYNGOLOGY

## 2025-01-22 NOTE — PROGRESS NOTES
Bogdan Solares is a 49 year old female.   Chief Complaint   Patient presents with    Post-Op     Thyroid      HPI:   She is postop right thyroid lobectomy.  She is having some mild tingling in her lip and she is taking Tums a few times a day.  She is having no significant pain or dysphagia.  She has had some persistent hoarseness.    REVIEW OF SYSTEMS:   GENERAL HEALTH: feels well otherwise  GENERAL : denies fever, chills, sweats, weight loss, weight gain  SKIN: denies any unusual skin lesions or rashes  RESPIRATORY: denies shortness of breath with exertion  NEURO: denies headaches    EXAM:   LMP 12/15/2024 (Approximate)     System Findings Details   Constitutional  Overall appearance - Normal.   Psychiatric  Orientation - Oriented to time, place, person & situation. Appropriate mood and affect.   Head/Face  Facial features -- Normal. Skull - Normal.   Eyes  Pupils equal ,round ,react to light and accomidate   Ears, Nose, Throat, Neck  Incision site is healing well without signs of infection.   Neurological  Memory - Normal. Cranial nerves - Cranial nerves II through XII grossly intact.   Lymph Detail  Submental. Submandibular. Anterior cervical. Posterior cervical. Supraclavicular.       ASSESSMENT AND PLAN:   1. Thyroid nodule  Pathology report reviewed with patient.  This shows papillary thyroid carcinoma at 1.9 cm.  Margins are free of tumor.  She has no thyroid nodules on the left-hand side.  Given the above thyroid lobectomy is a reasonable treatment and there is no need for completion thyroidectomy at this time.  She will undergo thyroid function test.  I would like her to follow-up with endocrinology.  She will try to wean down on calcium.  She had a very small thin nerve at the time of surgery.  She is having some hoarseness.  Likely this is due to pressure on that thin nerve and symptoms should improve with time.  She will definitely let me know if her hoarseness does not improve in the next month or 2.  -  TSH and Free T4 [E]; Future      The patient indicates understanding of these issues and agrees to the plan.    No follow-ups on file.    Dilip Bernal MD  1/21/2025  6:26 PM

## 2025-02-10 ENCOUNTER — LAB ENCOUNTER (OUTPATIENT)
Dept: LAB | Age: 50
End: 2025-02-10
Attending: OTOLARYNGOLOGY
Payer: COMMERCIAL

## 2025-02-10 ENCOUNTER — TELEPHONE (OUTPATIENT)
Facility: LOCATION | Age: 50
End: 2025-02-10

## 2025-02-10 DIAGNOSIS — E04.1 THYROID NODULE: ICD-10-CM

## 2025-02-10 LAB
T4 FREE SERPL-MCNC: 1.2 NG/DL (ref 0.8–1.7)
TSI SER-ACNC: 2.33 UIU/ML (ref 0.55–4.78)

## 2025-02-10 PROCEDURE — 84439 ASSAY OF FREE THYROXINE: CPT | Performed by: OTOLARYNGOLOGY

## 2025-02-10 PROCEDURE — 84443 ASSAY THYROID STIM HORMONE: CPT | Performed by: OTOLARYNGOLOGY

## 2025-02-10 NOTE — TELEPHONE ENCOUNTER
Pt is calling regarding new symptoms that she had experienced over the weekend. Pt had a thyroidectomy 01/14/2025. Pt said that the area on her neck is swollen experiencing redness, tenderness, and pain. Saturday night the area had burst with bloody pus but no clots. Pt said the area is still red and feels hard, but no pain at this time after it had burst. Pt does not have a fever and is still experiencing voice hoarseness as mentioned from last office notes on 01/21/2025. Pt is seeking medical advise on how to proceed and if she may need an antibiotic. Please advise.

## 2025-02-12 ENCOUNTER — OFFICE VISIT (OUTPATIENT)
Facility: LOCATION | Age: 50
End: 2025-02-12
Payer: COMMERCIAL

## 2025-02-12 DIAGNOSIS — E04.1 THYROID NODULE: Primary | ICD-10-CM

## 2025-02-12 PROCEDURE — 99024 POSTOP FOLLOW-UP VISIT: CPT | Performed by: OTOLARYNGOLOGY

## 2025-02-12 NOTE — PROGRESS NOTES
Bogdan Solares is a 49 year old female. No chief complaint on file.    HPI:   She is postop right thyroid lobectomy 1 month ago.  She has some hoarseness but she feels like her voice is improving.  She is having no symptoms of hypocalcemia.  She noticed an area of redness at the incision on the left-hand side and some drainage came out along with pain which is since resolved.    REVIEW OF SYSTEMS:   GENERAL HEALTH: feels well otherwise  GENERAL : denies fever, chills, sweats, weight loss, weight gain  SKIN: denies any unusual skin lesions or rashes  RESPIRATORY: denies shortness of breath with exertion  NEURO: denies headaches    EXAM:   LMP 12/15/2024 (Approximate)     System Findings Details   Constitutional  Overall appearance - Normal.   Psychiatric  Orientation - Oriented to time, place, person & situation. Appropriate mood and affect.   Head/Face  Facial features -- Normal. Skull - Normal.   Eyes  Pupils equal ,round ,react to light and accomidate   Ears, Nose, Throat, Neck  The incision is healing well.  I see no signs of residual infection.  There is no fluctuance or erythema of the neck or significant pain   Neurological  Memory - Normal. Cranial nerves - Cranial nerves II through XII grossly intact.   Lymph Detail  Submental. Submandibular. Anterior cervical. Posterior cervical. Supraclavicular.       ASSESSMENT AND PLAN:   1. Thyroid nodule  Status post right thyroid lobectomy for papillary thyroid carcinoma.  She has a follow-up with endocrinology soon.  I see no evidence of infection at this time.  Most likely she had an infection associated with the absorbable sutures which is since resolved on its own.  Her hoarseness is improving.  She will give me an update in 4 to 6 weeks.      The patient indicates understanding of these issues and agrees to the plan.    No follow-ups on file.    Dilip Bernal MD  2/12/2025  12:18 PM

## 2025-03-11 ENCOUNTER — LAB ENCOUNTER (OUTPATIENT)
Dept: LAB | Age: 50
End: 2025-03-11
Attending: INTERNAL MEDICINE
Payer: COMMERCIAL

## 2025-03-11 DIAGNOSIS — E11.9 TYPE 2 DIABETES MELLITUS WITHOUT COMPLICATION, WITH LONG-TERM CURRENT USE OF INSULIN (HCC): ICD-10-CM

## 2025-03-11 DIAGNOSIS — D64.9 NORMOCYTIC ANEMIA: ICD-10-CM

## 2025-03-11 DIAGNOSIS — Z79.4 TYPE 2 DIABETES MELLITUS WITHOUT COMPLICATION, WITH LONG-TERM CURRENT USE OF INSULIN (HCC): ICD-10-CM

## 2025-03-11 LAB
ALBUMIN SERPL-MCNC: 4.5 G/DL (ref 3.2–4.8)
ALBUMIN/GLOB SERPL: 1.7 {RATIO} (ref 1–2)
ALP LIVER SERPL-CCNC: 71 U/L
ALT SERPL-CCNC: 34 U/L
ANION GAP SERPL CALC-SCNC: 6 MMOL/L (ref 0–18)
AST SERPL-CCNC: 22 U/L (ref ?–34)
BASOPHILS # BLD AUTO: 0.07 X10(3) UL (ref 0–0.2)
BASOPHILS NFR BLD AUTO: 1.4 %
BILIRUB SERPL-MCNC: 0.3 MG/DL (ref 0.3–1.2)
BUN BLD-MCNC: 12 MG/DL (ref 9–23)
CALCIUM BLD-MCNC: 8.7 MG/DL (ref 8.7–10.6)
CHLORIDE SERPL-SCNC: 105 MMOL/L (ref 98–112)
CHOLEST SERPL-MCNC: 122 MG/DL (ref ?–200)
CO2 SERPL-SCNC: 28 MMOL/L (ref 21–32)
CREAT BLD-MCNC: 0.65 MG/DL
CREAT UR-SCNC: 122.1 MG/DL
EGFRCR SERPLBLD CKD-EPI 2021: 109 ML/MIN/1.73M2 (ref 60–?)
EOSINOPHIL # BLD AUTO: 0.12 X10(3) UL (ref 0–0.7)
EOSINOPHIL NFR BLD AUTO: 2.5 %
ERYTHROCYTE [DISTWIDTH] IN BLOOD BY AUTOMATED COUNT: 16.6 %
EST. AVERAGE GLUCOSE BLD GHB EST-MCNC: 214 MG/DL (ref 68–126)
FASTING PATIENT LIPID ANSWER: YES
FASTING STATUS PATIENT QL REPORTED: YES
GLOBULIN PLAS-MCNC: 2.7 G/DL (ref 2–3.5)
GLUCOSE BLD-MCNC: 224 MG/DL (ref 70–99)
HBA1C MFR BLD: 9.1 % (ref ?–5.7)
HCT VFR BLD AUTO: 35.6 %
HDLC SERPL-MCNC: 44 MG/DL (ref 40–59)
HGB BLD-MCNC: 10.2 G/DL
IMM GRANULOCYTES # BLD AUTO: 0.02 X10(3) UL (ref 0–1)
IMM GRANULOCYTES NFR BLD: 0.4 %
LDLC SERPL CALC-MCNC: 61 MG/DL (ref ?–100)
LYMPHOCYTES # BLD AUTO: 1.55 X10(3) UL (ref 1–4)
LYMPHOCYTES NFR BLD AUTO: 31.8 %
MCH RBC QN AUTO: 21.7 PG (ref 26–34)
MCHC RBC AUTO-ENTMCNC: 28.7 G/DL (ref 31–37)
MCV RBC AUTO: 75.6 FL
MICROALBUMIN UR-MCNC: 1.3 MG/DL
MICROALBUMIN/CREAT 24H UR-RTO: 10.6 UG/MG (ref ?–30)
MONOCYTES # BLD AUTO: 0.47 X10(3) UL (ref 0.1–1)
MONOCYTES NFR BLD AUTO: 9.7 %
NEUTROPHILS # BLD AUTO: 2.64 X10 (3) UL (ref 1.5–7.7)
NEUTROPHILS # BLD AUTO: 2.64 X10(3) UL (ref 1.5–7.7)
NEUTROPHILS NFR BLD AUTO: 54.2 %
NONHDLC SERPL-MCNC: 78 MG/DL (ref ?–130)
OSMOLALITY SERPL CALC.SUM OF ELEC: 295 MOSM/KG (ref 275–295)
PLATELET # BLD AUTO: 507 10(3)UL (ref 150–450)
POTASSIUM SERPL-SCNC: 4.6 MMOL/L (ref 3.5–5.1)
PROT SERPL-MCNC: 7.2 G/DL (ref 5.7–8.2)
RBC # BLD AUTO: 4.71 X10(6)UL
SODIUM SERPL-SCNC: 139 MMOL/L (ref 136–145)
TRIGL SERPL-MCNC: 85 MG/DL (ref 30–149)
VLDLC SERPL CALC-MCNC: 13 MG/DL (ref 0–30)
WBC # BLD AUTO: 4.9 X10(3) UL (ref 4–11)

## 2025-03-11 PROCEDURE — 85025 COMPLETE CBC W/AUTO DIFF WBC: CPT | Performed by: INTERNAL MEDICINE

## 2025-03-11 PROCEDURE — 80061 LIPID PANEL: CPT | Performed by: INTERNAL MEDICINE

## 2025-03-11 PROCEDURE — 83036 HEMOGLOBIN GLYCOSYLATED A1C: CPT | Performed by: INTERNAL MEDICINE

## 2025-03-11 PROCEDURE — 82570 ASSAY OF URINE CREATININE: CPT | Performed by: INTERNAL MEDICINE

## 2025-03-11 PROCEDURE — 80053 COMPREHEN METABOLIC PANEL: CPT | Performed by: INTERNAL MEDICINE

## 2025-03-11 PROCEDURE — 82043 UR ALBUMIN QUANTITATIVE: CPT | Performed by: INTERNAL MEDICINE

## 2025-03-13 ENCOUNTER — LAB ENCOUNTER (OUTPATIENT)
Dept: LAB | Age: 50
End: 2025-03-13
Attending: STUDENT IN AN ORGANIZED HEALTH CARE EDUCATION/TRAINING PROGRAM
Payer: COMMERCIAL

## 2025-03-13 ENCOUNTER — OFFICE VISIT (OUTPATIENT)
Facility: CLINIC | Age: 50
End: 2025-03-13
Payer: COMMERCIAL

## 2025-03-13 VITALS
WEIGHT: 141.19 LBS | SYSTOLIC BLOOD PRESSURE: 112 MMHG | HEART RATE: 84 BPM | DIASTOLIC BLOOD PRESSURE: 62 MMHG | RESPIRATION RATE: 16 BRPM | BODY MASS INDEX: 25.02 KG/M2 | OXYGEN SATURATION: 99 % | HEIGHT: 63 IN

## 2025-03-13 DIAGNOSIS — C73 THYROID CANCER (HCC): ICD-10-CM

## 2025-03-13 DIAGNOSIS — C73 PAPILLARY THYROID CARCINOMA (HCC): ICD-10-CM

## 2025-03-13 DIAGNOSIS — C73 THYROID CANCER (HCC): Primary | ICD-10-CM

## 2025-03-13 LAB
T4 FREE SERPL-MCNC: 1.3 NG/DL (ref 0.8–1.7)
TSI SER-ACNC: 1.99 UIU/ML (ref 0.55–4.78)

## 2025-03-13 PROCEDURE — 84443 ASSAY THYROID STIM HORMONE: CPT | Performed by: STUDENT IN AN ORGANIZED HEALTH CARE EDUCATION/TRAINING PROGRAM

## 2025-03-13 PROCEDURE — 84439 ASSAY OF FREE THYROXINE: CPT | Performed by: STUDENT IN AN ORGANIZED HEALTH CARE EDUCATION/TRAINING PROGRAM

## 2025-03-13 PROCEDURE — 83520 IMMUNOASSAY QUANT NOS NONAB: CPT | Performed by: STUDENT IN AN ORGANIZED HEALTH CARE EDUCATION/TRAINING PROGRAM

## 2025-03-13 NOTE — PROGRESS NOTES
Endocrinology Clinic Note    Name: Bogdan Solares   Date: 3/13/2025     Chief Complaint   Patient presents with    New Patient     NP here for f/u for thyroidectomy pcp referred her , Labs done 1/14/25, pt states no symptoms or concerns        HISTORY OF PRESENT ILLNESS (Initial consult 3/13/2025):    Bogdan Solares is a 49 year old female with PMHx significant for DM2, long QT syndrome,  HTN, HL, right thyroid nodule PTC s/p right thyroidectomy isthmus ectomy who presents for initial endocrine consultation for PTC    Thyroid Cancer History:  -Thyroid nodule incidental finding: on CT 2019, patient forgot all about it at that time it was noted to be a 1.2 cm calcified right thyroid nodule.  -Patient forgot all about this.  She switched doctors and with the new PCP just remmebered about CT with thyroid nodule  -Was able to get an ultrasound in 11/2024 which showed 1.3 cm TR 5 right thyroid nodule  -S/p FNA AUS.,  Genetic testing done which showed 50% risk of malignancy.  BRAF, RET,/PTC, TERT  mutations negative   -Patient was followed by ENT.  Suggested to have hemithyroidectomy done  -S/p hemithyroidectomy on 1/14/2025 pathology showed multifocal PTC  -Patient was referred here for further discussion.    -Denies having any history of thyroid cancer in family.  No history of radiation.  No history of any neck surgeries.  She never had any compressive symptoms with her thyroid nodule.  Denies even noticing follow-up she feels it may be because of Ozempic.    -She has stopped Ozempic because it was not helping with appetite.  -Post surgery doing fine.  Had some hoarseness of her voice which is improving.  Followed by ENT.  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, as nurse Yosef Hernandez     PAST MEDICAL HISTORY:   Past Medical History:    Allergic rhinitis    Anemia    Uterine Fibroids    Diabetes mellitus (HCC)    Dyslipidemia    Essential hypertension    Fibrocystic breast changes    Heavy menses    High blood  pressure    High cholesterol    Long QT syndrome    Microalbuminuria    Type II or unspecified type diabetes mellitus without mention of complication, not stated as uncontrolled    Unspecified sinusitis (chronic)    Visual impairment    glasses    Wears glasses        PAST SURGICAL HISTORY:   Past Surgical History:   Procedure Laterality Date    Colonoscopy      Daphne biopsy stereo nodule 1 site right (cpt=19081) Right 2019    benign       &     Other surgical history      Partial Thyroidectomy        CURRENT MEDICATIONS:     NovoLOG Mix 70/30 FlexPen 100 UNIT/ML Susp Pen-injector (Insulin Aspart Prot & Aspart 70/30) Inject 26 Units into the skin 2 (two) times daily. 60 mL 1    metFORMIN HCl 1000 MG Oral Tab Take 1 tablet (1,000 mg total) by mouth 2 (two) times daily with meals. 180 tablet 3    glipiZIDE ER 10 MG Oral Tablet 24 Hr Take 1 tablet (10 mg total) by mouth daily. 90 tablet 3    losartan 25 MG Oral Tab Take 1 tablet (25 mg total) by mouth daily. 90 tablet 3    atorvastatin 10 MG Oral Tab Take 1 tablet (10 mg total) by mouth nightly. 90 tablet 3    Ascorbic Acid (VITAMIN C) 1000 MG Oral Tab Take 1 tablet (1,000 mg total) by mouth daily.      Omega-3 1000 MG Oral Cap Take 1 capsule by mouth daily.      Cyanocobalamin (B-12) 500 MCG Oral Tab Take 1 tablet by mouth daily.      Cholecalciferol (VITAMIN D) 1000 UNITS Oral Tab Take 1 tablet by mouth daily.            ALLERGIES:  Allergies[1]     SOCIAL HISTORY:    Social History     Socioeconomic History    Marital status:      Spouse name: Not on file    Number of children: Not on file    Years of education: Not on file    Highest education level: Not on file   Occupational History    Not on file   Tobacco Use    Smoking status: Never    Smokeless tobacco: Never   Vaping Use    Vaping status: Never Used   Substance and Sexual Activity    Alcohol use: Not Currently     Comment: rarely    Drug use: No    Sexual activity: Yes      Partners: Male     Birth control/protection: Condom   Other Topics Concern     Service Not Asked    Blood Transfusions Not Asked    Caffeine Concern Yes     Comment: 1 cup of coffee x day    Occupational Exposure Not Asked    Hobby Hazards Not Asked    Sleep Concern Not Asked    Stress Concern Not Asked    Weight Concern Not Asked    Special Diet Not Asked    Back Care Not Asked    Exercise No    Bike Helmet Not Asked    Seat Belt Yes    Self-Exams Not Asked   Social History Narrative    Not on file     Social Drivers of Health     Food Insecurity: No Food Insecurity (1/14/2025)    Food Insecurity     Food Insecurity: Never true   Transportation Needs: No Transportation Needs (1/14/2025)    Transportation Needs     Lack of Transportation: No     Car Seat: Not on file   Stress: Not on file   Housing Stability: Low Risk  (1/14/2025)    Housing Stability     Housing Instability: No     Housing Instability Emergency: Not on file     Crib or Bassinette: Not on file          FAMILY HISTORY:   Family History   Problem Relation Age of Onset    Diabetes Father         DM    Hypertension Father     Other (CAD) Father     Diabetes Mother         DM    Hypertension Mother             REVIEW OF SYSTEMS:  Ten point review of systems has been performed and is otherwise negative and/or non-contributory, except as described above.      PHYSICAL EXAM:   Vitals:    03/13/25 1246   BP: 112/62   Pulse: 84   Resp: 16   SpO2: 99%   Weight: 141 lb 3.2 oz (64 kg)   Height: 5' 3\" (1.6 m)         Body mass index is 25.01 kg/m².     CONSTITUTIONAL:  awake, alert, cooperative, no apparent distress, and appears stated age  PSYCH: normal affect  EYES:  No proptosis,  conjunctiva normal  ENT:  Normocephalic, atraumatic  NECK:  Supple, symmetrical, thyroidectomy scar without dehiscence   LUNGS: breathing comfortably  CARDIOVASCULAR:  regular rate   ABDOMEN:  soft  SKIN:  no rashes and no lesions  EXTREMITIES: no edema      DATA:     Recent  Labs     07/27/23  0930 01/09/25  0859 02/10/25  0805   T4F  --  1.5 1.2   TSH 1.140 2.166 2.327      4/22/2019 CTA chest: Incidental finding noticed  OTHER:    1.2 cm calcified right thyroid nodule       11/7/2024 ultrasound thyroid      MEASUREMENTS:  RIGHT LOBE:  4.6 x 1.6 x 1.5 cm  LEFT LOBE:  4.9 x 1.4 x 1.7 cm  ISTHMUS:  0.1 cm     NODULES:  A peripheral calcified nodule in the right mid thyroid measures up to 1.3 x 1.1 x 1.0 cm.  The nodule is likely solid.  The nodule is very hypoechoic.  The nodule is wider than tall.  TR5 - Highly Suspicious (FNA if > or = 1 cm.  Follow if > or   = 0.5 cm.).         OTHER:  None.                   Impression   CONCLUSION:  Peripherally calcified nodule in the right thyroid.  Ultrasound-guided biopsy is recommended.         11/29/2024: FNA results of right thyroid nodule:  Final Diagnosis:   Fine needle aspiration, right thyroid nodule, direct smears and cytospin:  -Evaluation limited due to scant cellularity.  -Atypia of undetermined significance ([place] category 3).  -Scantly cellular aspirate with cytologic atypia. (See comment.)                 Pathology 1/14/2025 of right thyroid gland and isthmus  Thyroid gland, right lobe and isthmus, hemithyroidectomy:  -Multifocal papillary thyroid carcinoma, classic subtype.              -Tumor measures 1.9 cm in greatest dimension.              -No extrathyroidal extension identified.              -No angiolymphatic invasion identified.              -All margins negative for carcinoma (nearest margin <1 mm).  -Background thyroid with follicular nodular disease and focal chronic lymphocytic thyroiditis.  -See synoptic report.  SPECIMEN   Procedure  Right lobectomy with isthmusectomy (hemithyroidectomy)   TUMOR   Tumor Focality  Multifocal   Tumor Characteristics     Tumor Site  Right lobe   Tumor Size  Greatest Dimension (Centimeters): 1.9 cm   Additional Dimension (Centimeters)  1.4 cm     1 cm   Histologic Tumor Types and  Subtypes  Papillary carcinoma, classic subtype   Tumor Proliferative Activity     Mitotic Rate  Less than 3 mitoses per 2mm2   Tumor Necrosis  Not identified   Angioinvasion (vascular invasion)  Not identified   Lymphatic Invasion  Not identified   Perineural Invasion  Not identified   Extrathyroidal Extension  Not identified   Margin Status  All margins negative for carcinoma   Distance from Invasive Carcinoma to Closest Margin  Less than 1 mm   REGIONAL LYMPH NODES   Regional Lymph Node Status  Not applicable (no regional lymph nodes submitted or found)     ADDITIONAL FINDINGS   Additional Findings  Follicular nodular disease (Thyroid follicular nodular disease)     Thyroiditis: Focal chronic lymphocytic thryoiditis         ASSESSMENT AND PLAN:      ICD-10-CM    1. Thyroid cancer (HCC)  C73 TSH and Free T4 [E]     Human Antimouse Antibodies     Thyroxine (T4), Free, Dialysis/Mass Spectrometry ([facility])     TSH and Free T4 [E]     US THYROID (CPT=76536)      2. Papillary thyroid carcinoma (HCC)  C73 TSH and Free T4 [E]     Human Antimouse Antibodies     Thyroxine (T4), Free, Dialysis/Mass Spectrometry ([facility])     TSH and Free T4 [E]     US THYROID (CPT=76536)             # Multifocal papillary thyroid cancer, classic subtype:  # Low risk of recurrence:  -2019: CT incidental finding of 1.2 cm calcified right thyroid nodule  -No family history, no exposure to radiation.  Was obese.  No compressive symptoms  -Thyroid ultrasound 11/2024 showed 1.3 X1.1 X1 centimeter TR 5 nodule (solid, very hypoechoic, wider than tall, highly suspicious  -FNA on 11/2024 showed AUS-[place] 3.  Afirma genomic sequencing showed 50% risk of malignancy, mutations negative.  -S/p right hemithyroidectomy on 1/14/2025  -Path as above with 1.9 cm in greatest dimension.  No ETE, angiolymphatic invasion, all margins negative for cancer.  No lymph nodes were removed  -2/10/2025: TSH 2.3, free T4 1.2  Plan:  -Extensively  reviewed course to date with the patient including pathophysiology of PTC, approach to treatment of PTC, rationale and indications for radioiodine therapy, overview of surveillance for recurrence, concepts in TSH suppression, etc.  -Patient falls under low risk recurrence category in which the treatment options include hemithyroidectomy versus total thyroidectomy.  -Discussed goal TSH and importance of following up with thyroid ultrasounds.  -Obtain labs today: TSH goal 0.5-3  -Plan for next repeat set of labs in May as well as thyroid ultrasound for surveillance.  -Did discuss that depending upon the physical exam findings, and neck ultrasound findings during surveillance patient may require late completion thyroidectomy.  Patient agrees  -No need for QUIJANO therapy  -No need for TG, TG antibodies due to remnant thyroid tissue      The above plan was discussed in detail with the patient who verbalized understanding and agreement.      A total of 60 minutes was spent today on obtaining history, reviewing outside records, reviewing pertinent labs/imaging, reviewing relevant pathophysiology with patient, evaluating patient, providing multiple treatment options, communicating with patient's other providers as appropriate, and completing documentation and orders.      Jc Gracia MD  [facility]  3/13/2025     Note to patient: The 21 Century Cures Act makes medical notes like these available to patients in the interest of transparency. However, be advised this is a medical document. It is intended as peer to peer communication. It is written in medical language and may contain abbreviations or verbiage that are unfamiliar. It may appear blunt or direct. Medical documents are intended to carry relevant information, facts as evident, and the clinical opinion of the practitioner.           [1]   Allergies  Allergen Reactions    Ace Inhibitors Coughing

## 2025-03-13 NOTE — PATIENT INSTRUCTIONS
.Summary of our visit:    We will get labs today, depending on which I will let you know if we need to start a small dose of Levothyroxine.  If normal,we will repeat labs and thyroid US in June 2025  Please call the scheduling number to schedule the US        General follow up information:  Please let us know if you require any refills at least 1 week prior to your medication running out. If you do run out of medication, please call our office ASAP to request refills (do not wait until your follow up).  Please call us if you experience any problems with insurance coverage of medication, lab work, or imaging.   Lab results and imaging will typically be reviewed at follow up appointments, or within 3-5 business days of ALL results being in if you do not have an appointment scheduled in the near future. Our office will contact you for any abnormal results requiring more urgent follow up or action.   The on-call pager is for urgent matters only. If you are a type 1 diabetic and run out of insulin after business hours 8AM-4PM, you may call the on-call pager for a refill to a 24 hour pharmacy. If you have adrenal insufficiency and run out of steroids, you may call the on-call pager for a refill to a 24 hour pharmacy. All other refill requests should be requested during business hours.    Return Visit   [x] Dr. Gracia in 3 months   [x] Virtual visit  [x] labs  [x] Central scheduling # for ultrasound/nuclear med/CT/MRI/DXA/IR

## 2025-03-18 LAB — T4 FREE DIALYSIS/MS: 1.3 NG/DL

## 2025-03-19 LAB
HUMAN ANTI-MOUSE ANTIBODIES: <56 NG/ML
HUMAN ANTI-MOUSE ANTIBODIES: <56 NG/ML

## 2025-04-30 ENCOUNTER — OFFICE VISIT (OUTPATIENT)
Facility: CLINIC | Age: 50
End: 2025-04-30
Payer: COMMERCIAL

## 2025-04-30 VITALS
OXYGEN SATURATION: 99 % | HEIGHT: 63 IN | DIASTOLIC BLOOD PRESSURE: 80 MMHG | RESPIRATION RATE: 16 BRPM | WEIGHT: 145.38 LBS | BODY MASS INDEX: 25.76 KG/M2 | HEART RATE: 83 BPM | SYSTOLIC BLOOD PRESSURE: 130 MMHG

## 2025-04-30 DIAGNOSIS — E11.65 TYPE 2 DIABETES MELLITUS WITH HYPERGLYCEMIA, WITH LONG-TERM CURRENT USE OF INSULIN (HCC): Primary | ICD-10-CM

## 2025-04-30 DIAGNOSIS — Z79.4 TYPE 2 DIABETES MELLITUS WITH HYPERGLYCEMIA, WITH LONG-TERM CURRENT USE OF INSULIN (HCC): Primary | ICD-10-CM

## 2025-04-30 DIAGNOSIS — E55.9 VITAMIN D DEFICIENCY, UNSPECIFIED: ICD-10-CM

## 2025-04-30 DIAGNOSIS — E11.65 CONTROLLED TYPE 2 DIABETES MELLITUS WITH HYPERGLYCEMIA, WITHOUT LONG-TERM CURRENT USE OF INSULIN (HCC): ICD-10-CM

## 2025-04-30 PROCEDURE — 3079F DIAST BP 80-89 MM HG: CPT | Performed by: STUDENT IN AN ORGANIZED HEALTH CARE EDUCATION/TRAINING PROGRAM

## 2025-04-30 PROCEDURE — 3072F LOW RISK FOR RETINOPATHY: CPT | Performed by: STUDENT IN AN ORGANIZED HEALTH CARE EDUCATION/TRAINING PROGRAM

## 2025-04-30 PROCEDURE — 3008F BODY MASS INDEX DOCD: CPT | Performed by: STUDENT IN AN ORGANIZED HEALTH CARE EDUCATION/TRAINING PROGRAM

## 2025-04-30 PROCEDURE — 3075F SYST BP GE 130 - 139MM HG: CPT | Performed by: STUDENT IN AN ORGANIZED HEALTH CARE EDUCATION/TRAINING PROGRAM

## 2025-04-30 PROCEDURE — 99215 OFFICE O/P EST HI 40 MIN: CPT | Performed by: STUDENT IN AN ORGANIZED HEALTH CARE EDUCATION/TRAINING PROGRAM

## 2025-04-30 NOTE — PROGRESS NOTES
[facility] Note    Name: Bogdan Solares    Date: 4/30/2025    Bogdan Solares is a 49 year old female who presents for evaluation of T2DM management.     Chief complaint: Follow - Up (F/u pt here for DM, pt states finger sticking once a day, pt states no concerns at this time /Last A1C- 9.1 (3/11/25)/CGM- no/Last Foot exam- Due/Last Eye exam- Due)     PMHx significant for DM2, long QT syndrome,  HTN, HL, PTC s/p right thyroidectomy isthmusectomy, iron deficiency anemia       Subjective:   Initial HPI consult - 4/30/2025  Here to establish care.   Previously managed by PCP  Main concerns today: A1c is high. Wants to get on CGM -previously tried but was unsuccessful.    DM hx:  -Diagnosed with diabetes at age 31  -Started insulin: atleast since 2012, but even beyond that   -Family history- yes, strong diabetes family hx  of T2 DM parents, 3 out of 4 siblings have T2 DM (dad with CABG)  -Re: potential DM medication contraindication:   -Denies History of pancreatitis, Personal/fam hx of medullary thyroid cancer/MEN2, History of recent/frequent UTI/yeast infxn, Previous amputation related to diabetes, Hx of [id number] euglycemic DKA  - No hospitalizations for DKA or hypoglycemia     -Presence of associated DM complications (if positive, checkbox selected):  [] Macrovascular complications (CAD/CVA/PAD) CT calcium score 11/19/2019 0.  On basis.  [] Neuropathy  [] Retinopathy  [] Nephropathy  [x] HTN  [x] Hyperlipidemia  [] Stroke/TIA  [] Gastroparesis  [] Wound, ulcer or amputations    - Lifestyle: not the best (Works 3pm - 11 pm). 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 BF -8-9, L-noon, Dinner: 6 pm (if working), if not working 7pm. Late night meals after coming home    Exercise: none (just bought a treadmill after noting her A1c to be elevated)  - Modifying factors: diet and work  - Steroid use: No    DM meds at first office  visit:2025  Metformin 1000 mg BID   Novolog /30. 26 units before breakfast and  26 units before dinner  (5 mins before or right after she eats)  Glipizide 10 mg ER BID   reports good compliance    Previously trialed/failed DM meds:   -Ozempic - was scared about the thyroid issue since she also had a thyroid nodule (max dose used was 1 mg/week) -felt that Ozempic did not make a difference. Did not lose weight, did not affect her blood sugars  -Byetta: could not eat was very nauseasus even with the starting dose   -Januvia- felt that she was peeing too much at night (verified that pt was not prescribed Jardiance in the past)    Blood Glucose log reviewed. By recall, pt has not brought her glucometer  Checking 1 times per day.   Morning/fastin-160-200s  Hypoglycemia : rarely when she does not eat after giving insulin    History/Other:    Allergies, PMH, SocHx and FHx reviewed and updated as appropriate in Epic on Current Medications[1]  Allergies[2]  Current Medications[3]  Past Medical History[4]  Family History[5]  Social history: Reviewed.      ROS/Exam    REVIEW OF SYSTEMS: Ten point review of systems has been performed and is otherwise negative and/or non-contributory, except as described above.     VITALS  Vitals:    25 1523   BP: 130/80   Pulse: 83   Resp: 16   SpO2: 99%   Weight: 145 lb 6.4 oz (66 kg)   Height: 5' 3\" (1.6 m)       Wt Readings from Last 6 Encounters:   25 145 lb 6.4 oz (66 kg)   25 141 lb 3.2 oz (64 kg)   25 143 lb (64.9 kg)   24 139 lb 12.8 oz (63.4 kg)   24 137 lb (62.1 kg)   24 137 lb 6.4 oz (62.3 kg)       PHYSICAL EXAM  CONSTITUTIONAL:  awake, alert, cooperative, no apparent distress, and appears stated age   PSYCH: normal affect  LUNGS: breathing comfortably  CARDIOVASCULAR:  regular rate   NECK:  no palpable thyroid nodules   SKIN: sites of injection without any lipodystrophy/hypertrophy. No acanthosis nigricans  FEET: no ulcers,  monofilament 10/10 on Right and 10/10 on Left. no xerosis present       Overall glucose control:  Lab Results   Component Value Date    A1C 9.1 (H) 03/11/2025    A1C 7.9 (H) 11/26/2024    A1C 8.0 (H) 08/05/2024    A1C 8.2 (H) 07/27/2023    A1C 7.8 (H) 03/20/2023     Recent Labs     07/27/23  0930 03/11/25  0837   RBC 4.10 4.71   HGB 10.4* 10.2*   HCT 34.3* 35.6   MCV 83.7 75.6*   MCH 25.4* 21.7*   MCHC 30.3* 28.7*   RDW 14.8 16.6   NEPRELIM 4.10 2.64   WBC 7.0 4.9   .0 507.0*        Labs/Imaging: Pertinent imaging reviewed.      Supplementary Documentation:       Assessment & Plan:     ICD-10-CM    1. Type 2 diabetes mellitus with hyperglycemia, with long-term current use of insulin ([facility])  E11.65 C-Peptide [E]    Z79.4 Basic Metabolic Panel (8) [E]     empagliflozin (JARDIANCE) 10 MG Oral Tab     EDUCATION-OP REFERRAL TO DIAB NUTRITION MANAGEMENT 3 HRS      2. Controlled type 2 diabetes mellitus with hyperglycemia, without long-term current use of insulin ([facility])  E11.65           Bogdan Solares is a pleasant 49 year old female here for evaluation of:    #Diabetes Mellitus type 2: not at goal  PMHx of Type 2 diabetes mellitus diagnosed at age 31  -Strong family history of type 2 diabetes  - Has been on insulin in at least since 2012.  - No DKA episodes.  -Discussed the pathogenesis, natural course of diabetes. Patient understands the importance of glycemic control and the implications of uncontrolled diabetes including Diabetic ketoacidosis and various micro vascular and macrovascular complications.  Last A1c value was 9.1% done 3/11/2025.  However unreliable in the setting of iron deficiency anemia with recent hemoglobin being 10.2 and having microcytic anemia.  -Goal <7%. Importance of better glucose control in preventing onset/progression of end-organ damage discussed, as well as goals of therapy and clinical significance of A1C.  -Discussed the summary of glycemic goals for many nonpregnant adults with  diabetes  -Preprandial capillary plasma glucose   mg/dL*   -Peak postprandial capillary plasma glucose <180 mg/dL* 1-2 hrs after meal  -Discussed management of low glucose and targeted glucose levels and provided instructions in AVS   Plan:  -Obtain C-peptide levels  -If C-peptide levels detectable will consider switching to glargine once a day and managing postprandial hyperglycemia with other oral hypoglycemics.  If C-peptide undetectable patient is agreeable to switch to basal bolus regimen instead of 70/30 insulin  - Meanwhile continue current medications including glipizide 10 mg twice daily, metformin 1000 mg twice daily (last  on 3/2025, last B12 levels in 2017, patient currently on oral B12 tablets)  and 70/30 insulin 26 units twice daily.  - Instructed to take 70/30 insulin at least 15 minutes before meals.  -Start Jardiance 10 mg daily.  Patient to let us know if it is incurring significant cost.  Patient to ask for Farxiga coverage instead.  -Currently not amenable to starting GLP-1's due to previous experience of side effects  -Professional CGM placed today, discuss about it at next visit  - Patient is requesting CGM.  She will check with her insurance which CGM is covered and will let us know.  - Referral to CDE placed.  - patient is on insulin, and has suboptimal glycemic control including wide glycemic swings, thus would benefit from CGM  - continue to check BG 2x/day using glucometer or CGM  - meet with endo DM educator re:carb aware diet (T2DM)  - SGLT2i instructions provided re: surgery, times of illness/dehydration   - previously intolerant: to GLP1s    Lab Results   Component Value Date    B12 586 05/26/2017    -Counselled regarding risk of hypoglycemia associated with use of both glipizide and insulin  - BG log maintenance  explained in great detail, to get log and glucometer on next visit.  - Life style changes: Diet, Exercise: should target a weight loss of 7% and increase  exercise to at least 150min a week.  - Hypoglycemia recognition and management discussed    -Surveillance for Diabetes Complications & Risks  Foot exam/neuropathy: Last Foot Exam: 04/30/25  Daily feet exam explained, Monofilament sensation    Retinopathy screening: Last Dilated Eye Exam: 05/01/24  Eye Exam shows Diabetic Retinopathy?: No (no)  By Dr. Andrews at [facility]. Pt was given our office number so office could fax the results to us.    #Nephropathy screening: No CKD or microalbuminuria.  Patient is already on losartan 25 mg daily.  Also initiated Jardiance today.  Lab Results   Component Value Date    EGFRCR 109 03/11/2025    MICROALBCREA 10.6 03/11/2025   No results found for: \"CREAUR\", \"MICROALBUMIN\", \"MALBCREACALC\"    #Blood pressure control: - SBP is to goal <130   BP Readings from Last 1 Encounters:   04/30/25 130/80   BP Meds: losartan Tabs - 25 MG     #Hyperlipidemia  Lipids: LDL is to goal   Lab Results   Component Value Date    LDL 61 03/11/2025    TRIG 85 03/11/2025   Cholesterol Meds: atorvastatin Tabs - 10 MG        #Bone health: Will obtain recent vitamin D levels            Lab Results   Component Value Date    VITD 37.2 05/13/2021        #Thyroid screening  #Multifocal papillary thyroid cancer, classic subtype:  # Low risk of recurrence:  S/p right hemithyroidectomy on 1/14/2025   TSH goal 0.5-3   Currently not on any hormone replacement.  Will continue to monitor.    Recent Labs     01/09/25  0859 02/10/25  0805 03/13/25  1335   T4F 1.5 1.2 1.3   TSH 2.166 2.327 1.988      Return in about 7 weeks (around 6/19/2025).  45-minute appointment for both DM and thyroid cancer.    The above plan was discussed in detail with the patient who verbalized understanding and agreement.      A total of 45 minutes was spent today on obtaining history, reviewing outside records, reviewing pertinent labs/imaging, reviewing relevant pathophysiology with patient, evaluating patient, providing multiple treatment  options, communicating with patient's other providers as appropriate, and completing documentation and orders.      Jc Gracia MD  Replaced by [facility]  4/30/2025     Note to patient: The 21 Century Cures Act makes medical notes like these available to patients in the interest of transparency. However, be advised this is a medical document. It is intended as peer to peer communication. It is written in medical language and may contain abbreviations or verbiage that are unfamiliar. It may appear blunt or direct. Medical documents are intended to carry relevant information, facts as evident, and the clinical opinion of the practitioner.      In reviewing this note, please be advised that Dragon Voice Recognition software used to dictate the note may have made errors in recognizing some of the words or phrases.          [1]    NovoLOG Mix 70/30 FlexPen 100 UNIT/ML Susp Pen-injector (Insulin Aspart Prot & Aspart 70/30) Inject 26 Units into the skin 2 (two) times daily. 60 mL 1    metFORMIN HCl 1000 MG Oral Tab Take 1 tablet (1,000 mg total) by mouth 2 (two) times daily with meals. 180 tablet 3    glipiZIDE ER 10 MG Oral Tablet 24 Hr Take 1 tablet (10 mg total) by mouth daily. 90 tablet 3    losartan 25 MG Oral Tab Take 1 tablet (25 mg total) by mouth daily. 90 tablet 3    atorvastatin 10 MG Oral Tab Take 1 tablet (10 mg total) by mouth nightly. 90 tablet 3    Ascorbic Acid (VITAMIN C) 1000 MG Oral Tab Take 1 tablet (1,000 mg total) by mouth daily.      Omega-3 1000 MG Oral Cap Take 1 capsule by mouth daily.      Cyanocobalamin (B-12) 500 MCG Oral Tab Take 1 tablet by mouth daily.      Cholecalciferol (VITAMIN D) 1000 UNITS Oral Tab Take 1 tablet by mouth daily.     [2]   Allergies  Allergen Reactions    Ace Inhibitors Coughing   [3]   Current Outpatient Medications   Medication Sig Dispense Refill    NovoLOG Mix 70/30 FlexPen 100 UNIT/ML Susp Pen-injector (Insulin Aspart Prot & Aspart 70/30) Inject 26 Units into the skin  2 (two) times daily. 60 mL 1    metFORMIN HCl 1000 MG Oral Tab Take 1 tablet (1,000 mg total) by mouth 2 (two) times daily with meals. 180 tablet 3    glipiZIDE ER 10 MG Oral Tablet 24 Hr Take 1 tablet (10 mg total) by mouth daily. 90 tablet 3    losartan 25 MG Oral Tab Take 1 tablet (25 mg total) by mouth daily. 90 tablet 3    atorvastatin 10 MG Oral Tab Take 1 tablet (10 mg total) by mouth nightly. 90 tablet 3    Ascorbic Acid (VITAMIN C) 1000 MG Oral Tab Take 1 tablet (1,000 mg total) by mouth daily.      Omega-3 1000 MG Oral Cap Take 1 capsule by mouth daily.      Cyanocobalamin (B-12) 500 MCG Oral Tab Take 1 tablet by mouth daily.      Cholecalciferol (VITAMIN D) 1000 UNITS Oral Tab Take 1 tablet by mouth daily.     [4]   Past Medical History:   Allergic rhinitis    Anemia    Uterine Fibroids    Cancer (HCC)    Thyroid Cancer    Diabetes mellitus (HCC)    Dyslipidemia    Essential hypertension    Fibrocystic breast changes    Fibroids    Heavy menses    High blood pressure    High cholesterol    Long QT syndrome    Microalbuminuria    Type II or unspecified type diabetes mellitus without mention of complication, not stated as uncontrolled    Unspecified sinusitis (chronic)    Visual impairment    glasses    Wears glasses   [5]   Family History  Problem Relation Age of Onset    Diabetes Father         DM    Hypertension Father     Other (CAD) Father     Diabetes Mother         DM    Hypertension Mother

## 2025-04-30 NOTE — PROGRESS NOTES
4/30/25- Applied Freestyle Whitney Pro on the back of patients Right arm. Tolerated well. Patient to present to clinic in 2 weeks to review the data. Patient expressed understanding.      Lot: [id number]  SN: [id number]  Exp:07/31/2025

## 2025-04-30 NOTE — PATIENT INSTRUCTIONS
Return Visit: [x]  Dr. Gracia in 6 weeks for DM and Thyroid cancer  []  Directions to 1st floor lab    []  Give blood sugar log  []  PAP paperwork for Ozempic/Jardiance (english/Wallisian)       Updated diabetes medication instructions from today:   Diabetic Medications              empagliflozin (JARDIANCE) 10 MG Oral Tab (Taking) Take 1 tablet (10 mg total) by mouth daily.    NovoLOG Mix 70/30 FlexPen 100 UNIT/ML Susp Pen-injector (Insulin Aspart Prot & Aspart 70/30) (Taking) Inject 26 Units into the skin 2 (two) times daily.    metFORMIN HCl 1000 MG Oral Tab (Taking) Take 1 tablet (1,000 mg total) by mouth 2 (two) times daily with meals.    glipiZIDE ER 10 MG Oral Tablet 24 Hr (Taking) Take 1 tablet (10 mg total) by mouth daily.            Lab Results   Component Value Date    A1C 9.1 (H) 03/11/2025    A1C 7.9 (H) 11/26/2024    A1C 8.0 (H) 08/05/2024    A1C 8.2 (H) 07/27/2023    A1C 7.8 (H) 03/20/2023        Summary of today's visit:  Lets get labs - not fasting but after a good meal. Depending on the labs we can decide about further treatment options  Continue glipizide 10 mg BID, metformin 1000mg BID, Continue 70/30 26 units BID  Take 70/30 - 30 mins before you eat  Start jardiance 10 mg daily - if insurance is charging a lot - ask for Farxiga coverage.. and let me know   Check with your insurance which CGM is covered  and let us know.   Until then - check your Blood glucose levels atleast twice a day  We will place Professional CGM today, please drop it off at  in 2 weeks   I want you to meet my diabetic educator for good dietary options  Please check with insurance which CGM is covered and let me know. Once you get CGM - call our clinic for CGM training. Pt aware of 2 CDE appts - one for General DM education and diet and another when she gets CGM    - Please review the following if you are taking JARDIANCE OR FARXIGA:    This medication will remove extra sugar out of blood into your urine. It can be  dosed anytime of day, but morning is probably best time to take it as it can cause increased urination. Please drink at least 4 glasses of water daily to avoid dehydration. It does not cause low blood sugars (hypoglycemia). If you develop any low blood sugars, we will need to adjust other medications. Please call me if you develop any symptoms of urinary tract infection (burning with urination) or yeast infection (new rash itching or burning in the genital area).     While you are taking this medication please be mindful of sick day protocol (for illness, if you are vomiting, excessively exercising/sweating, or alcohol intake) to avoid dehydration:   -Temporarily hold the medication if you are in a dehydrated state. You can call the clinic for further instruction if you are sick and can't remember which diabetes medication to hold.  -Check blood sugar levels more often while sick  -Seek medical help early if you develop any abdominal pain, nausea or vomiting.  -Okay to resume this medication once you are eating /drinking regularly and no longer dehydrated.    Surgery protocol:  If you are having surgery please hold at least 4 days prior to your procedure.. Once eating/drinking normally after surgery, ok to resume (this avoids dehydration).      General follow up information:  Please let us know if you require any refills at least 1 week prior to your medication running out. If you do run out of medication, please call our office ASAP to request refills (do not wait until your follow up).   Please call our office if sugars at home are consistently greater than 250 or less than 70 for medication adjustment (do not wait until your follow up appointment).  Lab results and imaging will typically be reviewed at follow up appointments, or within 3-5 business days of ALL results being in if you do not have an appointment scheduled in the near future. Our office will contact you for any abnormal results requiring more urgent  follow up or action.   The on-call pager is for urgent matters only. If you are a type 1 diabetic and run out of insulin after business hours 8AM-4PM, you may call the on-call pager for a refill to a 24 hour pharmacy.  If you have adrenal insufficiency and run out of steroids, you may call the on-call pager for a refill to a 24 hour pharmacy. All other refill requests should be requested during business hours.    Office phone number: [phone]; phones are open Monday-Friday 8:30-4:30.       HOW TO TREAT LOW BLOOD SUGAR (Hypoglycemia)  Low blood sugar= Less than 70, or if you start to have symptoms (below)  Symptoms: Shaking or trembling, fast heart rate, extreme hunger, sweating, confusion/difficulty concentrating, dizziness.    How to treat a low blood sugar if you are able to eat/drink: The Rule of 15/15  If you are using continuous glucose monitor that says you are low, but you do not have any symptoms, verify on fingerstick that your blood sugar is actually low before treating.   Eat 15 grams of carbs (see examples below)  Check your blood sugar after 15 minutes. If it’s still below your target range, have another serving.   Repeat these steps until it’s in your target range. Once it’s in range, if you're nervous about your sugar going low again, have a protein source (ie, a spoonful of peanut butter).   If you have a CGM you want to look for how your arrow has changed. If you arrow is pointed up or sideways after 15 min, give your CGM more time OR check with a finger stick. Try not to eat more food until at least 15 min after the first BG check - otherwise you risk having a rebound high.  If you are experiencing symptoms and you are unable to check your blood glucose for any reason, treat the hypoglycemia.  If someone has a low blood sugar and is unconscious: Don’t hesitate to call 911. If someone is unconscious and glucagon is not available or someone does not know how to use it, call 911 immediately.      To treat a low, I recommend you carry with you easy, pre-portioned treatment for low blood sugars that are 15G of carbs:   - Children sized squeeze pouch applesauce (high fiber + carbs help prevent too high of a spike)  - Small children's sized juicebox- 15g carb --> 4oz juice box  - Glucose tablets from [employer], you can find them near diabetes supplies --> Note, you will need to eat 3-4 tablets to get to 15g of carbs  - Children sized fruit snack pack- look for one with 15 grams of total carbohydrate  - Choice of how to treat your low is important. Complex carbs, or foods that contain fats along with carbs (like chocolate) can slow the absorption of glucose and should NOT be used to treat an emergency low

## 2025-05-09 ENCOUNTER — LAB ENCOUNTER (OUTPATIENT)
Dept: LAB | Age: 50
End: 2025-05-09
Attending: STUDENT IN AN ORGANIZED HEALTH CARE EDUCATION/TRAINING PROGRAM
Payer: COMMERCIAL

## 2025-05-09 DIAGNOSIS — Z79.4 TYPE 2 DIABETES MELLITUS WITH HYPERGLYCEMIA, WITH LONG-TERM CURRENT USE OF INSULIN (HCC): ICD-10-CM

## 2025-05-09 DIAGNOSIS — E55.9 VITAMIN D DEFICIENCY, UNSPECIFIED: ICD-10-CM

## 2025-05-09 DIAGNOSIS — C73 THYROID CANCER (HCC): ICD-10-CM

## 2025-05-09 DIAGNOSIS — C73 PAPILLARY THYROID CARCINOMA (HCC): ICD-10-CM

## 2025-05-09 DIAGNOSIS — E11.65 TYPE 2 DIABETES MELLITUS WITH HYPERGLYCEMIA, WITH LONG-TERM CURRENT USE OF INSULIN (HCC): ICD-10-CM

## 2025-05-09 LAB
ANION GAP SERPL CALC-SCNC: 7 MMOL/L (ref 0–18)
BUN BLD-MCNC: 14 MG/DL (ref 9–23)
CALCIUM BLD-MCNC: 9.4 MG/DL (ref 8.7–10.6)
CHLORIDE SERPL-SCNC: 105 MMOL/L (ref 98–112)
CO2 SERPL-SCNC: 27 MMOL/L (ref 21–32)
CREAT BLD-MCNC: 0.63 MG/DL (ref 0.55–1.02)
EGFRCR SERPLBLD CKD-EPI 2021: 110 ML/MIN/1.73M2 (ref 60–?)
FASTING STATUS PATIENT QL REPORTED: NO
GLUCOSE BLD-MCNC: 140 MG/DL (ref 70–99)
OSMOLALITY SERPL CALC.SUM OF ELEC: 291 MOSM/KG (ref 275–295)
POTASSIUM SERPL-SCNC: 4 MMOL/L (ref 3.5–5.1)
SODIUM SERPL-SCNC: 139 MMOL/L (ref 136–145)
T4 FREE SERPL-MCNC: 1.2 NG/DL (ref 0.8–1.7)
TSI SER-ACNC: 1.53 UIU/ML (ref 0.55–4.78)
VIT D+METAB SERPL-MCNC: 50.4 NG/ML (ref 30–100)

## 2025-05-09 PROCEDURE — 84443 ASSAY THYROID STIM HORMONE: CPT | Performed by: STUDENT IN AN ORGANIZED HEALTH CARE EDUCATION/TRAINING PROGRAM

## 2025-05-09 PROCEDURE — 82306 VITAMIN D 25 HYDROXY: CPT | Performed by: STUDENT IN AN ORGANIZED HEALTH CARE EDUCATION/TRAINING PROGRAM

## 2025-05-09 PROCEDURE — 84439 ASSAY OF FREE THYROXINE: CPT | Performed by: STUDENT IN AN ORGANIZED HEALTH CARE EDUCATION/TRAINING PROGRAM

## 2025-05-09 PROCEDURE — 84681 ASSAY OF C-PEPTIDE: CPT | Performed by: STUDENT IN AN ORGANIZED HEALTH CARE EDUCATION/TRAINING PROGRAM

## 2025-05-09 PROCEDURE — 80048 BASIC METABOLIC PNL TOTAL CA: CPT | Performed by: STUDENT IN AN ORGANIZED HEALTH CARE EDUCATION/TRAINING PROGRAM

## 2025-05-11 LAB — C-PEPTIDE: 1.8 NG/ML

## 2025-05-14 ENCOUNTER — TELEPHONE (OUTPATIENT)
Facility: CLINIC | Age: 50
End: 2025-05-14

## 2025-05-28 ENCOUNTER — PATIENT MESSAGE (OUTPATIENT)
Facility: CLINIC | Age: 50
End: 2025-05-28

## 2025-05-28 DIAGNOSIS — E11.65 CONTROLLED TYPE 2 DIABETES MELLITUS WITH HYPERGLYCEMIA, WITHOUT LONG-TERM CURRENT USE OF INSULIN (HCC): Primary | ICD-10-CM

## 2025-05-30 NOTE — TELEPHONE ENCOUNTER
Sure, okay to proceed with medical necessity prior authorization paperwork. Any updates on Jardiance/Farxiga coverage per insurance?

## 2025-05-31 RX ORDER — HYDROCHLOROTHIAZIDE 12.5 MG/1
1 CAPSULE ORAL AS DIRECTED
Qty: 6 EACH | Refills: 1 | Status: SHIPPED | OUTPATIENT
Start: 2025-05-31

## 2025-06-02 NOTE — TELEPHONE ENCOUNTER
Stop Novolog 70/30 and start Glargine 24 units once daily. Continue with Jardiance 10 mg daily. Continue with Glipizide 10 mg BID and metformin 1000 mg BID. Since her next appointment is in two weeks, if she's unable to obtain a CGM before then, she should check her blood glucose at least three times a day--fasting in the morning, at bedtime daily, and either before lunch or before dinner

## 2025-06-10 ENCOUNTER — TELEPHONE (OUTPATIENT)
Facility: CLINIC | Age: 50
End: 2025-06-10

## 2025-06-10 NOTE — TELEPHONE ENCOUNTER
Fax from [facility]     Patient has been denied for CGM     Placed in [id number]

## 2025-06-16 ENCOUNTER — HOSPITAL ENCOUNTER (OUTPATIENT)
Dept: ULTRASOUND IMAGING | Age: 50
Discharge: HOME | End: 2025-06-16
Attending: STUDENT IN AN ORGANIZED HEALTH CARE EDUCATION/TRAINING PROGRAM
Payer: COMMERCIAL

## 2025-06-16 DIAGNOSIS — C73 THYROID CANCER (HCC): ICD-10-CM

## 2025-06-16 DIAGNOSIS — C73 PAPILLARY THYROID CARCINOMA (HCC): ICD-10-CM

## 2025-06-16 PROCEDURE — 76536 US EXAM OF HEAD AND NECK: CPT | Performed by: STUDENT IN AN ORGANIZED HEALTH CARE EDUCATION/TRAINING PROGRAM

## 2025-06-19 ENCOUNTER — TELEMEDICINE (OUTPATIENT)
Facility: CLINIC | Age: 50
End: 2025-06-19
Payer: COMMERCIAL

## 2025-06-19 DIAGNOSIS — E11.65 TYPE 2 DIABETES MELLITUS WITH HYPERGLYCEMIA, WITH LONG-TERM CURRENT USE OF INSULIN (HCC): Primary | ICD-10-CM

## 2025-06-19 DIAGNOSIS — C73 PAPILLARY THYROID CARCINOMA (HCC): ICD-10-CM

## 2025-06-19 DIAGNOSIS — Z79.4 TYPE 2 DIABETES MELLITUS WITH HYPERGLYCEMIA, WITH LONG-TERM CURRENT USE OF INSULIN (HCC): Primary | ICD-10-CM

## 2025-06-19 DIAGNOSIS — C73 THYROID CANCER (HCC): ICD-10-CM

## 2025-06-19 PROCEDURE — 98007 SYNCH AUDIO-VIDEO EST HI 40: CPT | Performed by: STUDENT IN AN ORGANIZED HEALTH CARE EDUCATION/TRAINING PROGRAM

## 2025-06-19 PROCEDURE — 95251 CONT GLUC MNTR ANALYSIS I&R: CPT | Performed by: STUDENT IN AN ORGANIZED HEALTH CARE EDUCATION/TRAINING PROGRAM

## 2025-06-19 NOTE — PATIENT INSTRUCTIONS
Return Visit:  With Jc Gracia MD: Return in about 3 months (around 9/19/2025) for DM .      Updated diabetes medication instructions from today:   Diabetic Medications              empagliflozin (JARDIANCE) 25 MG Oral Tab (Taking) Take 1 tablet (25 mg total) by mouth daily.    insulin glargine (LANTUS SOLOSTAR) 100 UNIT/ML Subcutaneous Solution Pen-injector Take 24 units subQ once daily.    metFORMIN HCl 1000 MG Oral Tab Take 1 tablet (1,000 mg total) by mouth 2 (two) times daily with meals.    glipiZIDE ER 10 MG Oral Tablet 24 Hr Take 1 tablet (10 mg total) by mouth daily.            Lab Results   Component Value Date    A1C 9.1 (H) 03/11/2025    A1C 7.9 (H) 11/26/2024    A1C 8.0 (H) 08/05/2024    A1C 8.2 (H) 07/27/2023    A1C 7.8 (H) 03/20/2023        Summary of today's visit:  Continue Metformin 1000 mg BID  2. Continue Glipizde 10 mg BID  3. Continue Glargine 24 units daily  4. Increase Jardiance 10--> 25 (Take 2 tabs of current dose of 10 mg and when you are done you can start 25, prescriptions sent)  If you notice that fasting blood glucose is < 70, stop the evening dose of glipizide, take the morning dose and continue the rest of the medications  5. Blood work before next appt to check for A1c  6. Thyroid US in Dec 2025    - Please review the following if you are taking JARDIANCE     This medication will remove extra sugar out of blood into your urine. It can be dosed anytime of day, but morning is probably best time to take it as it can cause increased urination. Please drink at least 4 glasses of water daily to avoid dehydration. It does not cause low blood sugars (hypoglycemia). If you develop any low blood sugars, we will need to adjust other medications. Please call me if you develop any symptoms of urinary tract infection (burning with urination) or yeast infection (new rash itching or burning in the genital area).     While you are taking this medication please be mindful of sick day protocol (for  illness, if you are vomiting, excessively exercising/sweating, or alcohol intake) to avoid dehydration:   -Temporarily hold the medication if you are in a dehydrated state. You can call the clinic for further instruction if you are sick and can't remember which diabetes medication to hold.  -Check blood sugar levels more often while sick  -Seek medical help early if you develop any abdominal pain, nausea or vomiting.  -Okay to resume this medication once you are eating /drinking regularly and no longer dehydrated.    Surgery protocol:  If you are having surgery please hold at least 4 days prior to your procedure.. Once eating/drinking normally after surgery, ok to resume (this avoids dehydration).      General follow up information:  Please let us know if you require any refills at least 1 week prior to your medication running out. If you do run out of medication, please call our office ASAP to request refills (do not wait until your follow up).   Please call our office if sugars at home are consistently greater than 250 or less than 70 for medication adjustment (do not wait until your follow up appointment).  Lab results and imaging will typically be reviewed at follow up appointments, or within 3-5 business days of ALL results being in if you do not have an appointment scheduled in the near future. Our office will contact you for any abnormal results requiring more urgent follow up or action.   The on-call pager is for urgent matters only. If you are a type 1 diabetic and run out of insulin after business hours 8AM-4PM, you may call the on-call pager for a refill to a 24 hour pharmacy.  If you have adrenal insufficiency and run out of steroids, you may call the on-call pager for a refill to a 24 hour pharmacy. All other refill requests should be requested during business hours.    Office phone number: [phone]; phones are open Monday-Friday 8:30-4:30.       HOW TO TREAT LOW BLOOD SUGAR (Hypoglycemia)  Low  blood sugar= Less than 70, or if you start to have symptoms (below)  Symptoms: Shaking or trembling, fast heart rate, extreme hunger, sweating, confusion/difficulty concentrating, dizziness.    How to treat a low blood sugar if you are able to eat/drink: The Rule of 15/15  If you are using continuous glucose monitor that says you are low, but you do not have any symptoms, verify on fingerstick that your blood sugar is actually low before treating.   Eat 15 grams of carbs (see examples below)  Check your blood sugar after 15 minutes. If it’s still below your target range, have another serving.   Repeat these steps until it’s in your target range. Once it’s in range, if you're nervous about your sugar going low again, have a protein source (ie, a spoonful of peanut butter).   If you have a CGM you want to look for how your arrow has changed. If you arrow is pointed up or sideways after 15 min, give your CGM more time OR check with a finger stick. Try not to eat more food until at least 15 min after the first BG check - otherwise you risk having a rebound high.  If you are experiencing symptoms and you are unable to check your blood glucose for any reason, treat the hypoglycemia.  If someone has a low blood sugar and is unconscious: Don’t hesitate to call 911. If someone is unconscious and glucagon is not available or someone does not know how to use it, call 911 immediately.     To treat a low, I recommend you carry with you easy, pre-portioned treatment for low blood sugars that are 15G of carbs:   - Children sized squeeze pouch applesauce (high fiber + carbs help prevent too high of a spike)  - Small children's sized juicebox- 15g carb --> 4oz juice box  - Glucose tablets from [email or web address], you can find them near diabetes supplies --> Note, you will need to eat 3-4 tablets to get to 15g of carbs  - Children sized fruit snack pack- look for one with 15 grams of total carbohydrate  - Choice of how to treat your low  is important. Complex carbs, or foods that contain fats along with carbs (like chocolate) can slow the absorption of glucose and should NOT be used to treat an emergency low

## 2025-06-19 NOTE — PROGRESS NOTES
This visit was performed via live interactive two-way video.  Clinician Location: Clinic [place]  Patient Location: Office, [place]   Bogdan Solares  verbally consents to a telemedicine service with live, interactive video and audio on 6/19/2025    Patient understands and accepts financial responsibility for any deductible, co-insurance and/or co-pays associated with this service.      [facility] Note    Name: Bogdan Solares    Date: 6/19/2025    Bogdan Solares is a 50 year old female who presents for evaluation of T2DM management.     Chief complaint: Follow - Up (Diabetes and thyroid cancer)     PMHx significant for DM2, long QT syndrome,  HTN, HL, PTC s/p right thyroidectomy isthmusectomy, iron deficiency anemia       Subjective:   Initial HPI consult - 4/30/2025  Previously managed by PCP  Main concerns today: A1c is high. Wants to get on CGM -previously tried but was unsuccessful.    DM hx:  -Diagnosed with diabetes at age 31  -Started insulin: atleast since 2012, but even beyond that   -Family history- yes, strong diabetes family hx  of T2 DM parents, 3 out of 4 siblings have T2 DM (dad with CABG)  -Re: potential DM medication contraindication: Denies History of pancreatitis, Personal/fam hx of medullary thyroid cancer/MEN2, History of recent/frequent UTI/yeast infxn, Previous amputation related to diabetes, Hx of MMUZ2s-njyaign euglycemic DKA  - No hospitalizations for DKA or hypoglycemia     -Presence of associated DM complications (if positive, checkbox selected):  [] Macrovascular complications (CAD/CVA/PAD) CT calcium score 11/19/2019 0.  On basis.  [] Neuropathy  [] Retinopathy  [] Nephropathy  [x] HTN  [x] Hyperlipidemia  [] Stroke/TIA  [] Gastroparesis  [] Wound, ulcer or amputations    - Lifestyle: not the best (Works 3pm - 11 pm). 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 BF -8-9, L-noon, Dinner: 6 pm (if working), if not working 7pm. Late  night meals after coming home    Exercise: none (just bought a treadmill after noting her A1c to be elevated)  - Modifying factors: diet and work  - Steroid use: No    DM meds at first office visit:2025  Metformin 1000 mg BID   Novolog 70/30. 26 units before breakfast and  26 units before dinner  (5 mins before or right after she eats)  Glipizide 10 mg ER BID   reports good compliance    Previously trialed/failed DM meds:   -Ozempic - was scared about the thyroid issue since she also had a thyroid nodule (max dose used was 1 mg/week) -felt that Ozempic did not make a difference. Did not lose weight, did not affect her blood sugars  -Byetta: could not eat was very nauseasus even with the starting dose   -Januvia- felt that she was peeing too much at night (verified that pt was not prescribed Jardiance in the past)    Blood Glucose log reviewed. By recall, pt has not brought her glucometer  Checking 1 times per day.   Morning/fastin-160-200s  Hypoglycemia : rarely when she does not eat after giving insulin    Interval Hx: 2025  Re DM:- Pro CGM was placed intially, sensor did not work, new sensor applied on . Pt returned sensor      - Unfortunately insurance not covering any CGM  -Pro CGM data reviewed: TIR 74%, meal excursions noted, occasional hypos over night- unclear if due to pressure of the sensor. No perceived hypoglycemia  -Current meds Glargine 24 units daily in the morning, metformin thousand twice daily, glipizide 10 twice daily. Also started jardiance 10 mg (new med added last time). Increased polyuria but tolerable. She is increasing water consumption  - BG checks: -140, post prandial is 180-190- checking BP every day - normal. No Hypos    Re thyroid cancer: reviewed labs and TUS from 25  EASUREMENTS:  RIGHT LOBE:  2.4 x 1.2 x 0.7 cm  LEFT LOBE:  0.2 cm  ISTHMUS:  4.9 x 1.2 x 1.7     NODULES:  0.7 x 0.4 x 0.8 cm nonspecific heterogeneous hypoechoic region.  This is in the  postsurgical right thyroid fossa.     OTHER:  None.      Impression   CONCLUSION:    Sequelae of right thyroidectomy.  There is some residual right thyroid tissue suggested.  Heterogeneous hypoechoic region in this area measuring 7 mm is nonspecific.  This may represent residual of previously noted larger nodule in the right thyroid  lobe.     Component      Latest Ref Rng 5/9/2025   T4,Free (Direct)      0.8 - 1.7 ng/dL 1.2    TSH      0.550 - 4.780 uIU/mL 1.535        Lab Results   Component Value Date    T4F 1.2 05/09/2025    TSH 1.535 05/09/2025     TSH is at goal 0.5-3.    History/Other:    Allergies, PMH, SocHx and FHx reviewed and updated as appropriate in Epic on Current Medications[1]  Allergies[2]  Current Medications[3]  Past Medical History[4]  Family History[5]  Social history: Reviewed.      ROS/Exam    REVIEW OF SYSTEMS: Ten point review of systems has been performed and is otherwise negative and/or non-contributory, except as described above.     VITALS  There were no vitals filed for this visit.      Wt Readings from Last 6 Encounters:   04/30/25 145 lb 6.4 oz (66 kg)   03/13/25 141 lb 3.2 oz (64 kg)   01/14/25 143 lb (64.9 kg)   12/13/24 139 lb 12.8 oz (63.4 kg)   08/27/24 137 lb (62.1 kg)   07/25/24 137 lb 6.4 oz (62.3 kg)     PHYSICAL EXAMINATION:  Limited due to telemedicine encounter    Constitutional Not in acute distress  Pulmonary: no wheezing heard  No coughing on the phone. Speaking in full sentences   Neurological:  Alert and oriented to person, place and time.   Psychiatric: Normal affect, mood and behavior appropriate        Overall glucose control:  Lab Results   Component Value Date    A1C 9.1 (H) 03/11/2025    A1C 7.9 (H) 11/26/2024    A1C 8.0 (H) 08/05/2024    A1C 8.2 (H) 07/27/2023    A1C 7.8 (H) 03/20/2023     Recent Labs     07/27/23  0930 03/11/25  0837   RBC 4.10 4.71   HGB 10.4* 10.2*   HCT 34.3* 35.6   MCV 83.7 75.6*   MCH 25.4* 21.7*   MCHC 30.3* 28.7*   RDW 14.8 16.6    NEPRELIM 4.10 2.64   WBC 7.0 4.9   .0 507.0*        Labs/Imaging: Pertinent imaging reviewed.      Supplementary Documentation:       Assessment & Plan:     ICD-10-CM    1. Type 2 diabetes mellitus with hyperglycemia, with long-term current use of insulin (HCC)  E11.65     Z79.4       2. Papillary thyroid carcinoma (HCC)  C73       3. Thyroid cancer (HCC)  C73             Bogdan Solares is a pleasant 50 year old female here for evaluation of:    #Diabetes Mellitus type 2: uncontrolled initially with A1c at 9.1% (3/11/25), unreliable in this pt with anemia  PMHx of Type 2 diabetes mellitus diagnosed at age 31  -Strong family history of type 2 diabetes  - Has been on insulin in at least since 2012.  - No DKA episodes.  Last A1c value was 9.1% done 3/11/2025.  However unreliable in the setting of iron deficiency anemia with recent hemoglobin being 10.2 and having microcytic anemia.  -Goal <7%. Importance of better glucose control in preventing onset/progression of end-organ damage discussed, as well as goals of therapy and clinical significance of A1C.  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 BG better per pt -140, post prandial is 180-190. Plan to increase jardiance  Plan:  -Continue Metformin 1000 mg BID  Continue Glipizde 10 mg BID  Continue Glargine 24 units daily  Increase Jardiance 10--> 25 (take 2 tabs of current dose and then 25 will be prescribed)  -Currently not amenable to starting GLP-1's due to previous experience of side effects  - Referral to CDE placed.  - patient is on insulin, and has suboptimal glycemic control including wide glycemic swings, thus would benefit from CGM  - continue to check BG 2x/day using glucometer or CGM  - meet with  endo DM educator re:carb aware diet (T2DM)  - SGLT2i instructions provided re: surgery, times of illness/dehydration   - previously intolerant: to GLP1s   Lab Results   Component Value Date    B12 586 05/26/2017    -Counselled regarding risk of hypoglycemia associated with use of both glipizide and insulin. If pt notices hypoglycemia, she is to stop evening glipizide 10 mg  - BG log maintenance  explained in great detail, to get log and glucometer on next visit.  - Life style changes: Diet, Exercise: should target a weight loss of 7% and increase exercise to at least 150min a week.  - Hypoglycemia recognition and management discussed    -Surveillance for Diabetes Complications & Risks  Foot exam/neuropathy: Last Foot Exam: 04/30/25  Daily feet exam explained, Monofilament sensation    Retinopathy screening: Last Dilated Eye Exam: 05/01/24  Eye Exam shows Diabetic Retinopathy?: No (no)  By Dr. Andrews at [place]. Pt was given our office number so office could fax the results to us.    #Nephropathy screening: No CKD or microalbuminuria.  Patient is already on losartan 25 mg daily and jardiance  Lab Results   Component Value Date    EGFRCR 110 05/09/2025    MICROALBCREA 10.6 03/11/2025   No results found for: \"CREAUR\", \"MICROALBUMIN\", \"MALBCREACALC\"    #Blood pressure control: - SBP is to goal <130   BP Readings from Last 1 Encounters:   04/30/25 130/80   BP Meds: losartan Tabs - 25 MG     #Hyperlipidemia  Lipids: LDL is to goal   Lab Results   Component Value Date    LDL 61 03/11/2025    TRIG 85 03/11/2025   Cholesterol Meds: atorvastatin Tabs - 10 MG        #Bone health: Continue to take D3 1000 international daily /daily            Lab Results   Component Value Date    VITD 50.4 05/09/2025          # Multifocal papillary thyroid cancer, classic subtype: S/p right hemithyroidectomy on 1/14/2025   # Low risk of recurrence:  -2019: CT incidental finding of 1.2 cm calcified right thyroid nodule  -No family history, no  exposure to radiation.  Was obese.  No compressive symptoms  -Thyroid ultrasound 11/2024 showed 1.3 X1.1 X1 centimeter TR 5 nodule (solid, very hypoechoic, wider than tall, highly suspicious  -FNA on 11/2024 showed AUS-Wise 3.  Afirma genomic sequencing showed 50% risk of malignancy, mutations negative.  -S/p right hemithyroidectomy on 1/14/2025  -Path as above with 1.9 cm in greatest dimension.  No ETE, angiolymphatic invasion, all margins negative for cancer.  No lymph nodes were removed  -2/10/2025: TSH 2.3, free T4 1.2  -3/13/25: TSH 1.98, HAMA negative  5/9/25: TSH 1.53  6/2025: 0.7 x 0.4 x 0.8 cm nonspecific heterogeneous hypoechoic region.  This is in the postsurgical right thyroid fossa. This may represent residual of previously noted larger nodule in the right thyroid  lobe.   Plan:  -Discussed goal TSH and importance of following up with thyroid ultrasounds.  -TSH goal 0.5-3, will repeat TFT in 6 months  -Currently not on any hormone replacement.  Will continue to monitor.  -Repeat TUS and TFT in 6 months (12/2025), if enlarging on TUSwill consider bx of the residual tissue  -Did discuss that depending upon the physical exam findings, and neck ultrasound findings during surveillance patient may require late completion thyroidectomy.  Patient agrees  -No need for QUIJANO therapy  -No need for TG, TG antibodies due to remnant thyroid tissue      Recent Labs     02/10/25  0805 03/13/25  1335 05/09/25  0910   T4F 1.2 1.3 1.2   TSH 2.327 1.988 1.535      Return in about 3 months (around 9/19/2025) for DM .        The above plan was discussed in detail with the patient who verbalized understanding and agreement.      A total of 45 minutes was spent today on obtaining history, reviewing outside records, reviewing pertinent labs/imaging, reviewing relevant pathophysiology with patient, evaluating patient, providing multiple treatment options, communicating with patient's other providers as appropriate, and  completing documentation and orders.      Jc Grcaia MD  [facility]  6/19/2025       Note to patient: The 21 Century Cures Act makes medical notes like these available to patients in the interest of transparency. However, be advised this is a medical document. It is intended as peer to peer communication. It is written in medical language and may contain abbreviations or verbiage that are unfamiliar. It may appear blunt or direct. Medical documents are intended to carry relevant information, facts as evident, and the clinical opinion of the practitioner.      In reviewing this note, please be advised that Dragon Voice Recognition software used to dictate the note may have made errors in recognizing some of the words or phrases.              [1]   No outpatient medications have been marked as taking for the 6/19/25 encounter (Appointment) with Jc Gracia MD.   [2]   Allergies  Allergen Reactions    Ace Inhibitors Coughing   [3]   Current Outpatient Medications   Medication Sig Dispense Refill    Continuous Glucose Sensor (DEXCOM G7 SENSOR) Does not apply Misc 1 each Every 10 days. Use as directed every 10 days 9 each 1    insulin glargine (LANTUS SOLOSTAR) 100 UNIT/ML Subcutaneous Solution Pen-injector Take 24 units subQ once daily. 30 mL 1    Insulin Pen Needle (BD PEN NEEDLE NATA 2ND GEN) 32G X 4 MM Does not apply Misc Use to inject once daily. 3 month supply. 100 each 5    empagliflozin (JARDIANCE) 10 MG Oral Tab Take 1 tablet (10 mg total) by mouth daily. 90 tablet 1    metFORMIN HCl 1000 MG Oral Tab Take 1 tablet (1,000 mg total) by mouth 2 (two) times daily with meals. 180 tablet 3    glipiZIDE ER 10 MG Oral Tablet 24 Hr Take 1 tablet (10 mg total) by mouth daily. 90 tablet 3    losartan 25 MG Oral Tab Take 1 tablet (25 mg total) by mouth daily. 90 tablet 3    atorvastatin 10 MG Oral Tab Take 1 tablet (10 mg total) by mouth nightly. 90 tablet 3    Ascorbic Acid (VITAMIN C) 1000 MG Oral Tab Take 1  tablet (1,000 mg total) by mouth daily.      Omega-3 1000 MG Oral Cap Take 1 capsule by mouth daily.      Cyanocobalamin (B-12) 500 MCG Oral Tab Take 1 tablet by mouth daily.      Cholecalciferol (VITAMIN D) 1000 UNITS Oral Tab Take 1 tablet by mouth daily.     [4]   Past Medical History:   Allergic rhinitis    Anemia    Uterine Fibroids    Cancer (HCC)    Thyroid Cancer    Diabetes mellitus (HCC)    Dyslipidemia    Essential hypertension    Fibrocystic breast changes    Fibroids    Heavy menses    High blood pressure    High cholesterol    Long QT syndrome    Microalbuminuria    Type II or unspecified type diabetes mellitus without mention of complication, not stated as uncontrolled    Unspecified sinusitis (chronic)    Visual impairment    glasses    Wears glasses   [5]   Family History  Problem Relation Age of Onset    Diabetes Father         DM    Hypertension Father     Other (CAD) Father     Diabetes Mother         DM    Hypertension Mother

## 2025-07-07 RX ORDER — LOSARTAN POTASSIUM 25 MG/1
25 TABLET ORAL DAILY
Qty: 90 TABLET | Refills: 3 | Status: SHIPPED | OUTPATIENT
Start: 2025-07-07

## 2025-07-07 RX ORDER — ATORVASTATIN CALCIUM 10 MG/1
10 TABLET, FILM COATED ORAL NIGHTLY
Qty: 90 TABLET | Refills: 3 | Status: SHIPPED | OUTPATIENT
Start: 2025-07-07

## 2025-07-07 NOTE — TELEPHONE ENCOUNTER
Refill Per Protocol     Requested Prescriptions   Pending Prescriptions Disp Refills    ATORVASTATIN 10 MG Oral Tab [Pharmacy Med Name: ATORVASTATIN 10 MG TABLET] 90 tablet 3     Sig: TAKE 1 TABLET BY MOUTH EVERY DAY AT NIGHT       Cholesterol Medication Protocol Passed - 7/7/2025  8:58 AM        Passed - ALT < 80     Lab Results   Component Value Date    ALT 34 03/11/2025             Passed - ALT resulted within past year        Passed - Lipid panel within past 12 months     Lab Results   Component Value Date    CHOLEST 122 03/11/2025    TRIG 85 03/11/2025    HDL 44 03/11/2025    LDL 61 03/11/2025    VLDL 13 03/11/2025    TCHDLRATIO 3.64 05/26/2017    NONHDLC 78 03/11/2025             Passed - In person appointment or virtual visit in the past 12 mos or appointment in next 3 mos     Recent Outpatient Visits              2 weeks ago Type 2 diabetes mellitus with hyperglycemia, with long-term current use of insulin ([facility])    [facility]    [facility]icine    2 months ago Type 2 diabetes mellitus with hyperglycemia, with long-term current use of insulin ([facility]    [facility] Visit    3 months ago Thyroid cancer ([facility])    [facility] Rosy Elizabeth Amtul, MD    Office Visit    4 months ago Thyroid nodule    [facility] [place] Rodney T, MD    Office Visit    5 months ago Thyroid nodule    [facility] [place] Rodney T, MD    Office Visit          Future Appointments         Provider Department Appt Notes    In 2 months Jc Gracia [facility] / Thyroid function post partial thyroidectomy  LVV 06/19/2025                    Passed - Medication is active on med list          LOSARTAN 25 MG Oral Tab  [Pharmacy Med Name: LOSARTAN POTASSIUM 25 MG TAB] 90 tablet 3     Sig: Take 1 tablet (25 mg total) by mouth daily.       Hypertension Medications Protocol Passed - 7/7/2025  8:58 AM        Passed - CMP or BMP in past 12 months        Passed - Last BP reading less than 140/90     BP Readings from Last 1 Encounters:   04/30/25 130/80               Passed - In person appointment or virtual visit in the past 12 mos or appointment in next 3 mos     Recent Outpatient Visits              2 weeks ago Type 2 diabetes mellitus with hyperglycemia, with long-term current use of insulin ([facility] [place] [facility]    [facility]    2 months ago Type 2 diabetes mellitus with hyperglycemia, with long-term current use of insulin ([facility])    [facility] [place] [facility]    Office Visit    3 months ago Thyroid cancer ([facility])    [facility] [place] [facility]    Office Visit    4 months ago Thyroid nodule    [facility] [place] [facility]    Office Visit    5 months ago Thyroid nodule    [facility] [place] [facility]    Office Visit          Future Appointments         Provider Department Appt Notes    In 2 months Jc Gracia MD [facility] [place] FU 3 months / Thyroid function post partial thyroidectomy  LVV 06/19/2025                    Passed - EGFRCR or GFRNAA > 50     GFR Evaluation  EGFRCR: 110 , resulted on 5/9/2025          Passed - Medication is active on med list

## 2025-08-21 ENCOUNTER — TELEPHONE (OUTPATIENT)
Dept: INTERNAL MEDICINE CLINIC | Facility: CLINIC | Age: 50
End: 2025-08-21

## 2025-08-21 DIAGNOSIS — Z13.220 SCREENING FOR LIPOID DISORDERS: ICD-10-CM

## 2025-08-21 DIAGNOSIS — Z13.0 SCREENING FOR ENDOCRINE, METABOLIC AND IMMUNITY DISORDER: ICD-10-CM

## 2025-08-21 DIAGNOSIS — Z13.0 SCREENING FOR BLOOD DISEASE: ICD-10-CM

## 2025-08-21 DIAGNOSIS — Z13.29 SCREENING FOR THYROID DISORDER: ICD-10-CM

## 2025-08-21 DIAGNOSIS — Z00.00 ROUTINE GENERAL MEDICAL EXAMINATION AT A HEALTH CARE FACILITY: Primary | ICD-10-CM

## 2025-08-21 DIAGNOSIS — Z13.29 SCREENING FOR ENDOCRINE, METABOLIC AND IMMUNITY DISORDER: ICD-10-CM

## 2025-08-21 DIAGNOSIS — Z13.228 SCREENING FOR ENDOCRINE, METABOLIC AND IMMUNITY DISORDER: ICD-10-CM

## (undated) DEVICE — SUT PERMA- 2-0 30IN NABSRB BLK TIE SILK

## (undated) DEVICE — SLEEVE COMPR MD KNEE LEN SGL USE KENDALL SCD

## (undated) DEVICE — 3M™ STERI-STRIP™ REINFORCED ADHESIVE SKIN CLOSURES, R1547, 1/2 IN X 4 IN (12 MM X 100 MM), 6 STRIPS/ENVELOPE: Brand: 3M™ STERI-STRIP™

## (undated) DEVICE — PREMIUM WET SKIN PREP TRAY: Brand: MEDLINE INDUSTRIES, INC.

## (undated) DEVICE — PROBE 8225101 5PK STD PRASS FL TIP ROHS

## (undated) DEVICE — PACK DRAPE HEAD/NECK STER

## (undated) DEVICE — SPONGE: SPECIALTY PEANUT XR 100/CS: Brand: MEDICAL ACTION INDUSTRIES

## (undated) DEVICE — GLOVE SUR 7.5 SENSICARE PI PIP CRM PWD F

## (undated) DEVICE — 3M™ TEGADERM™ TRANSPARENT FILM DRESSING FRAME STYLE, 1626W, 4 IN X 4-3/4 IN (10 CM X 12 CM), 50/CT 4CT/CASE: Brand: 3M™ TEGADERM™

## (undated) DEVICE — SUT MCRYL 4-0 27IN ABSRB UD 19MM PS-2 3/8

## (undated) DEVICE — LIGASURE EXACT DISSECTOR: Brand: LIGASURE

## (undated) DEVICE — ANTIBACTERIAL UNDYED BRAIDED (POLYGLACTIN 910), SYNTHETIC ABSORBABLE SUTURE: Brand: COATED VICRYL

## (undated) DEVICE — POWDER HEMSTAT 3GM OXIDIZED REGENERATED CELOS

## (undated) DEVICE — SUT PERMA- 3-0 18IN NABSRB BLK TIE SILK

## (undated) DEVICE — E-Z CLEAN, NON-STICK, PTFE COATED, ELECTROSURGICAL NEEDLE ELECTRODE, MODIFIED EXTENDED INSULATION, 2.75 INCH (7 CM): Brand: MEGADYNE

## (undated) DEVICE — PAD SACRAL SPAN AID

## (undated) DEVICE — GAUZE,SPONGE,4"X4",12PLY,STERILE,LF,2'S: Brand: MEDLINE

## (undated) DEVICE — SOLUTION IRRIG 1000ML 0.9% NACL USP BTL

## (undated) NOTE — LETTER
Date & Time: 4/22/2019, 4:38 AM  Patient: Ruth Shabazz  Encounter Provider(s):    Radha Sanchez MD       To Whom It May Concern:    Jitendra Lin was seen and treated in our department on 4/22/2019. She should not return to work until 04/23/2019.

## (undated) NOTE — LETTER
CLARIFICATION FOR E-SSS    To: Dr. Bernal     Patient Name: Bogdan Solares  -Age / Sex: 1975-A: 49 y  female   Medical Records: [id number] [facility]: [id number]      Procedure Description:  Thyroid Total Lobectomy, Unilateral ; With or Without Isthmusectomy, Right Side    Please note that clarification is needed on the Electronic-Surgery Scheduling Sheet (E-SSS)  the original is included with this letter. Please have physician review and make changes on the faxed copy of the E-SSS.    Procedure per patient should be:  Partial Thyroidectomy, Possible Right Side Isthmusectomy    Per E-SSS admit type is  Outpatient in a Bed    Please review and submit change if needed    Other:     ALL CHANGES MUST BE DOCUMENTED ON THE E-SSS AND SIGNED BY THE PHYSICIAN    After physician has made the changes and signed the E-SSS please fax it to [phone] and these changes will then be made in Epic by the OR schedulers.     If you have any questions please call Pre-Admission Testing at [phone]    Thank you

## (undated) NOTE — LETTER
Date & Time: 1/21/2025, 4:25 PM  Patient: Bogdan Solares      To Whom It May Concern:    Bogdan Solares was seen and treated in our department on 1/21/2025. She may return to work on Monday 1/27/2025.    If you have any questions or concerns, please do not hesitate to call.        Dilip Bernal M.D.

## (undated) NOTE — ED AVS SNAPSHOT
Sharee Zhang   MRN: [id number]    Department:  THE [facility] Emergency Department in [place]   Date of Visit:  4/22/2019           Disclosure     Insurance plans vary and the physician(s) referred by the ER may not be covered by your plan.  Please contact tell this physician (or your personal doctor if your instructions are to return to your personal doctor) about any new or lasting problems. The primary care or specialist physician will see patients referred from the [facility] Emergency Department.  Estefania Benedict

## (undated) NOTE — MR AVS SNAPSHOT
[facility] [address]  [address] [facility]  [phone]               Thank you for choosing us for your health care visit with Екатерина Strange MD.  We are glad to serve you and happy to provide you with t Instructions and Information about Your Health    Thank you for choosing Hua Gupta MD at [place] [address]  To Do: Gus EDMONDS [facility]  1.  Ultrasound Call central scheduling [phone] to schedule your test.  Most tests are done in Building A inside ER b [phone] (For example: Holter Monitor, Cardiac Stress tests,Event Monitor, or 2D Echocardiograms)  •[facility] call [phone] usually in [address] [facility] in Clinic in [place] at [facility].  [address] Mon-Fri at 8am-7:30pm, and Sat/Sun 9am-4 To best provide you care, patients receiving maintenance medications need to be seen at least twice a year. .         Allergies as of Jun 20, 2017     Ace Inhibitors                 Today's Vital Signs     BP Pulse Temp Height Weight BMI    110/70 mmHg 70 9 Visit [facility] online at  [email or web address]